# Patient Record
Sex: FEMALE | Race: BLACK OR AFRICAN AMERICAN | NOT HISPANIC OR LATINO
[De-identification: names, ages, dates, MRNs, and addresses within clinical notes are randomized per-mention and may not be internally consistent; named-entity substitution may affect disease eponyms.]

---

## 2022-12-08 ENCOUNTER — NON-APPOINTMENT (OUTPATIENT)
Age: 72
End: 2022-12-08

## 2023-02-02 ENCOUNTER — NON-APPOINTMENT (OUTPATIENT)
Age: 73
End: 2023-02-02

## 2023-11-21 ENCOUNTER — INPATIENT (INPATIENT)
Facility: HOSPITAL | Age: 73
LOS: 6 days | Discharge: HOME | DRG: 273 | End: 2023-11-28
Attending: INTERNAL MEDICINE | Admitting: INTERNAL MEDICINE
Payer: MEDICARE

## 2023-11-21 VITALS
HEIGHT: 65.5 IN | HEART RATE: 133 BPM | TEMPERATURE: 97 F | WEIGHT: 293 LBS | RESPIRATION RATE: 18 BRPM | SYSTOLIC BLOOD PRESSURE: 72 MMHG | OXYGEN SATURATION: 100 % | DIASTOLIC BLOOD PRESSURE: 57 MMHG

## 2023-11-21 LAB
ALBUMIN SERPL ELPH-MCNC: 4.3 G/DL — SIGNIFICANT CHANGE UP (ref 3.3–5)
ALBUMIN SERPL ELPH-MCNC: 4.3 G/DL — SIGNIFICANT CHANGE UP (ref 3.3–5)
ALP SERPL-CCNC: 94 U/L — SIGNIFICANT CHANGE UP (ref 40–120)
ALP SERPL-CCNC: 94 U/L — SIGNIFICANT CHANGE UP (ref 40–120)
ALT FLD-CCNC: 31 U/L — SIGNIFICANT CHANGE UP (ref 10–45)
ALT FLD-CCNC: 31 U/L — SIGNIFICANT CHANGE UP (ref 10–45)
ANION GAP SERPL CALC-SCNC: 16 MMOL/L — SIGNIFICANT CHANGE UP (ref 5–17)
ANION GAP SERPL CALC-SCNC: 16 MMOL/L — SIGNIFICANT CHANGE UP (ref 5–17)
ANISOCYTOSIS BLD QL: SIGNIFICANT CHANGE UP
ANISOCYTOSIS BLD QL: SIGNIFICANT CHANGE UP
APPEARANCE UR: ABNORMAL
APPEARANCE UR: ABNORMAL
APTT BLD: 21.7 SEC — LOW (ref 24.5–35.6)
APTT BLD: 21.7 SEC — LOW (ref 24.5–35.6)
AST SERPL-CCNC: 92 U/L — HIGH (ref 10–40)
AST SERPL-CCNC: 92 U/L — HIGH (ref 10–40)
BASOPHILS # BLD AUTO: 0 K/UL — SIGNIFICANT CHANGE UP (ref 0–0.2)
BASOPHILS # BLD AUTO: 0 K/UL — SIGNIFICANT CHANGE UP (ref 0–0.2)
BASOPHILS NFR BLD AUTO: 0 % — SIGNIFICANT CHANGE UP (ref 0–2)
BASOPHILS NFR BLD AUTO: 0 % — SIGNIFICANT CHANGE UP (ref 0–2)
BILIRUB DIRECT SERPL-MCNC: 0.3 MG/DL — SIGNIFICANT CHANGE UP (ref 0–0.3)
BILIRUB DIRECT SERPL-MCNC: 0.3 MG/DL — SIGNIFICANT CHANGE UP (ref 0–0.3)
BILIRUB INDIRECT FLD-MCNC: 0.8 MG/DL — SIGNIFICANT CHANGE UP (ref 0.2–1)
BILIRUB INDIRECT FLD-MCNC: 0.8 MG/DL — SIGNIFICANT CHANGE UP (ref 0.2–1)
BILIRUB SERPL-MCNC: 1.1 MG/DL — SIGNIFICANT CHANGE UP (ref 0.2–1.2)
BILIRUB SERPL-MCNC: 1.1 MG/DL — SIGNIFICANT CHANGE UP (ref 0.2–1.2)
BILIRUB UR-MCNC: NEGATIVE — SIGNIFICANT CHANGE UP
BILIRUB UR-MCNC: NEGATIVE — SIGNIFICANT CHANGE UP
BLD GP AB SCN SERPL QL: NEGATIVE — SIGNIFICANT CHANGE UP
BLD GP AB SCN SERPL QL: NEGATIVE — SIGNIFICANT CHANGE UP
BUN SERPL-MCNC: 41 MG/DL — HIGH (ref 7–23)
BUN SERPL-MCNC: 41 MG/DL — HIGH (ref 7–23)
CALCIUM SERPL-MCNC: 10.4 MG/DL — SIGNIFICANT CHANGE UP (ref 8.4–10.5)
CALCIUM SERPL-MCNC: 10.4 MG/DL — SIGNIFICANT CHANGE UP (ref 8.4–10.5)
CHLORIDE SERPL-SCNC: 96 MMOL/L — SIGNIFICANT CHANGE UP (ref 96–108)
CHLORIDE SERPL-SCNC: 96 MMOL/L — SIGNIFICANT CHANGE UP (ref 96–108)
CO2 SERPL-SCNC: 21 MMOL/L — LOW (ref 22–31)
CO2 SERPL-SCNC: 21 MMOL/L — LOW (ref 22–31)
COLOR SPEC: SIGNIFICANT CHANGE UP
COLOR SPEC: SIGNIFICANT CHANGE UP
CREAT SERPL-MCNC: 1.89 MG/DL — HIGH (ref 0.5–1.3)
CREAT SERPL-MCNC: 1.89 MG/DL — HIGH (ref 0.5–1.3)
DIFF PNL FLD: ABNORMAL
DIFF PNL FLD: ABNORMAL
EGFR: 28 ML/MIN/1.73M2 — LOW
EGFR: 28 ML/MIN/1.73M2 — LOW
EOSINOPHIL # BLD AUTO: 0 K/UL — SIGNIFICANT CHANGE UP (ref 0–0.5)
EOSINOPHIL # BLD AUTO: 0 K/UL — SIGNIFICANT CHANGE UP (ref 0–0.5)
EOSINOPHIL NFR BLD AUTO: 0 % — SIGNIFICANT CHANGE UP (ref 0–6)
EOSINOPHIL NFR BLD AUTO: 0 % — SIGNIFICANT CHANGE UP (ref 0–6)
GIANT PLATELETS BLD QL SMEAR: PRESENT — SIGNIFICANT CHANGE UP
GIANT PLATELETS BLD QL SMEAR: PRESENT — SIGNIFICANT CHANGE UP
GLUCOSE SERPL-MCNC: 196 MG/DL — HIGH (ref 70–99)
GLUCOSE SERPL-MCNC: 196 MG/DL — HIGH (ref 70–99)
GLUCOSE UR QL: NEGATIVE MG/DL — SIGNIFICANT CHANGE UP
GLUCOSE UR QL: NEGATIVE MG/DL — SIGNIFICANT CHANGE UP
HCT VFR BLD CALC: 34 % — LOW (ref 34.5–45)
HCT VFR BLD CALC: 34 % — LOW (ref 34.5–45)
HGB BLD-MCNC: 11.5 G/DL — SIGNIFICANT CHANGE UP (ref 11.5–15.5)
HGB BLD-MCNC: 11.5 G/DL — SIGNIFICANT CHANGE UP (ref 11.5–15.5)
INR BLD: 0.99 — SIGNIFICANT CHANGE UP (ref 0.85–1.18)
INR BLD: 0.99 — SIGNIFICANT CHANGE UP (ref 0.85–1.18)
KETONES UR-MCNC: ABNORMAL MG/DL
KETONES UR-MCNC: ABNORMAL MG/DL
LACTATE SERPL-SCNC: 3.2 MMOL/L — HIGH (ref 0.5–2)
LACTATE SERPL-SCNC: 3.2 MMOL/L — HIGH (ref 0.5–2)
LEUKOCYTE ESTERASE UR-ACNC: ABNORMAL
LEUKOCYTE ESTERASE UR-ACNC: ABNORMAL
LIDOCAIN IGE QN: 261 U/L — HIGH (ref 7–60)
LIDOCAIN IGE QN: 261 U/L — HIGH (ref 7–60)
LYMPHOCYTES # BLD AUTO: 0.8 K/UL — LOW (ref 1–3.3)
LYMPHOCYTES # BLD AUTO: 0.8 K/UL — LOW (ref 1–3.3)
LYMPHOCYTES # BLD AUTO: 9.6 % — LOW (ref 13–44)
LYMPHOCYTES # BLD AUTO: 9.6 % — LOW (ref 13–44)
MACROCYTES BLD QL: SIGNIFICANT CHANGE UP
MACROCYTES BLD QL: SIGNIFICANT CHANGE UP
MAGNESIUM SERPL-MCNC: 1.7 MG/DL — SIGNIFICANT CHANGE UP (ref 1.6–2.6)
MAGNESIUM SERPL-MCNC: 1.7 MG/DL — SIGNIFICANT CHANGE UP (ref 1.6–2.6)
MANUAL SMEAR VERIFICATION: SIGNIFICANT CHANGE UP
MANUAL SMEAR VERIFICATION: SIGNIFICANT CHANGE UP
MCHC RBC-ENTMCNC: 33.8 GM/DL — SIGNIFICANT CHANGE UP (ref 32–36)
MCHC RBC-ENTMCNC: 33.8 GM/DL — SIGNIFICANT CHANGE UP (ref 32–36)
MCHC RBC-ENTMCNC: 37 PG — HIGH (ref 27–34)
MCHC RBC-ENTMCNC: 37 PG — HIGH (ref 27–34)
MCV RBC AUTO: 109.3 FL — HIGH (ref 80–100)
MCV RBC AUTO: 109.3 FL — HIGH (ref 80–100)
MICROCYTES BLD QL: SLIGHT — SIGNIFICANT CHANGE UP
MICROCYTES BLD QL: SLIGHT — SIGNIFICANT CHANGE UP
MONOCYTES # BLD AUTO: 0.22 K/UL — SIGNIFICANT CHANGE UP (ref 0–0.9)
MONOCYTES # BLD AUTO: 0.22 K/UL — SIGNIFICANT CHANGE UP (ref 0–0.9)
MONOCYTES NFR BLD AUTO: 2.6 % — SIGNIFICANT CHANGE UP (ref 2–14)
MONOCYTES NFR BLD AUTO: 2.6 % — SIGNIFICANT CHANGE UP (ref 2–14)
NEUTROPHILS # BLD AUTO: 7.19 K/UL — SIGNIFICANT CHANGE UP (ref 1.8–7.4)
NEUTROPHILS # BLD AUTO: 7.19 K/UL — SIGNIFICANT CHANGE UP (ref 1.8–7.4)
NEUTROPHILS NFR BLD AUTO: 86 % — HIGH (ref 43–77)
NEUTROPHILS NFR BLD AUTO: 86 % — HIGH (ref 43–77)
NITRITE UR-MCNC: NEGATIVE — SIGNIFICANT CHANGE UP
NITRITE UR-MCNC: NEGATIVE — SIGNIFICANT CHANGE UP
NT-PROBNP SERPL-SCNC: 3990 PG/ML — HIGH (ref 0–300)
NT-PROBNP SERPL-SCNC: 3990 PG/ML — HIGH (ref 0–300)
OVALOCYTES BLD QL SMEAR: SLIGHT — SIGNIFICANT CHANGE UP
OVALOCYTES BLD QL SMEAR: SLIGHT — SIGNIFICANT CHANGE UP
PH UR: 5.5 — SIGNIFICANT CHANGE UP (ref 5–8)
PH UR: 5.5 — SIGNIFICANT CHANGE UP (ref 5–8)
PHOSPHATE SERPL-MCNC: 2.3 MG/DL — LOW (ref 2.5–4.5)
PHOSPHATE SERPL-MCNC: 2.3 MG/DL — LOW (ref 2.5–4.5)
PLAT MORPH BLD: ABNORMAL
PLAT MORPH BLD: ABNORMAL
PLATELET # BLD AUTO: 136 K/UL — LOW (ref 150–400)
PLATELET # BLD AUTO: 136 K/UL — LOW (ref 150–400)
POIKILOCYTOSIS BLD QL AUTO: SLIGHT — SIGNIFICANT CHANGE UP
POIKILOCYTOSIS BLD QL AUTO: SLIGHT — SIGNIFICANT CHANGE UP
POLYCHROMASIA BLD QL SMEAR: SLIGHT — SIGNIFICANT CHANGE UP
POLYCHROMASIA BLD QL SMEAR: SLIGHT — SIGNIFICANT CHANGE UP
POTASSIUM SERPL-MCNC: 3.8 MMOL/L — SIGNIFICANT CHANGE UP (ref 3.5–5.3)
POTASSIUM SERPL-MCNC: 3.8 MMOL/L — SIGNIFICANT CHANGE UP (ref 3.5–5.3)
POTASSIUM SERPL-SCNC: 3.8 MMOL/L — SIGNIFICANT CHANGE UP (ref 3.5–5.3)
POTASSIUM SERPL-SCNC: 3.8 MMOL/L — SIGNIFICANT CHANGE UP (ref 3.5–5.3)
PROMYELOCYTES # FLD: 0.9 % — HIGH (ref 0–0)
PROMYELOCYTES # FLD: 0.9 % — HIGH (ref 0–0)
PROT SERPL-MCNC: 8.4 G/DL — HIGH (ref 6–8.3)
PROT SERPL-MCNC: 8.4 G/DL — HIGH (ref 6–8.3)
PROT UR-MCNC: 30 MG/DL
PROT UR-MCNC: 30 MG/DL
PROTHROM AB SERPL-ACNC: 11.3 SEC — SIGNIFICANT CHANGE UP (ref 9.5–13)
PROTHROM AB SERPL-ACNC: 11.3 SEC — SIGNIFICANT CHANGE UP (ref 9.5–13)
RBC # BLD: 3.11 M/UL — LOW (ref 3.8–5.2)
RBC # BLD: 3.11 M/UL — LOW (ref 3.8–5.2)
RBC # FLD: 14.2 % — SIGNIFICANT CHANGE UP (ref 10.3–14.5)
RBC # FLD: 14.2 % — SIGNIFICANT CHANGE UP (ref 10.3–14.5)
RBC BLD AUTO: ABNORMAL
RBC BLD AUTO: ABNORMAL
RH IG SCN BLD-IMP: POSITIVE — SIGNIFICANT CHANGE UP
SMUDGE CELLS # BLD: PRESENT — SIGNIFICANT CHANGE UP
SMUDGE CELLS # BLD: PRESENT — SIGNIFICANT CHANGE UP
SODIUM SERPL-SCNC: 133 MMOL/L — LOW (ref 135–145)
SODIUM SERPL-SCNC: 133 MMOL/L — LOW (ref 135–145)
SP GR SPEC: 1.03 — SIGNIFICANT CHANGE UP (ref 1–1.03)
SP GR SPEC: 1.03 — SIGNIFICANT CHANGE UP (ref 1–1.03)
TROPONIN T, HIGH SENSITIVITY RESULT: 46 NG/L — SIGNIFICANT CHANGE UP (ref 0–51)
TROPONIN T, HIGH SENSITIVITY RESULT: 46 NG/L — SIGNIFICANT CHANGE UP (ref 0–51)
UROBILINOGEN FLD QL: 1 MG/DL — SIGNIFICANT CHANGE UP (ref 0.2–1)
UROBILINOGEN FLD QL: 1 MG/DL — SIGNIFICANT CHANGE UP (ref 0.2–1)
VARIANT LYMPHS # BLD: 0.9 % — SIGNIFICANT CHANGE UP (ref 0–6)
VARIANT LYMPHS # BLD: 0.9 % — SIGNIFICANT CHANGE UP (ref 0–6)
WBC # BLD: 8.36 K/UL — SIGNIFICANT CHANGE UP (ref 3.8–10.5)
WBC # BLD: 8.36 K/UL — SIGNIFICANT CHANGE UP (ref 3.8–10.5)
WBC # FLD AUTO: 8.36 K/UL — SIGNIFICANT CHANGE UP (ref 3.8–10.5)
WBC # FLD AUTO: 8.36 K/UL — SIGNIFICANT CHANGE UP (ref 3.8–10.5)

## 2023-11-21 PROCEDURE — 71045 X-RAY EXAM CHEST 1 VIEW: CPT | Mod: 26

## 2023-11-21 PROCEDURE — 99285 EMERGENCY DEPT VISIT HI MDM: CPT

## 2023-11-21 PROCEDURE — 70450 CT HEAD/BRAIN W/O DYE: CPT | Mod: 26,MA

## 2023-11-21 RX ORDER — SODIUM CHLORIDE 9 MG/ML
1000 INJECTION INTRAMUSCULAR; INTRAVENOUS; SUBCUTANEOUS ONCE
Refills: 0 | Status: COMPLETED | OUTPATIENT
Start: 2023-11-21 | End: 2023-11-21

## 2023-11-21 RX ORDER — METOPROLOL TARTRATE 50 MG
12.5 TABLET ORAL EVERY 8 HOURS
Refills: 0 | Status: DISCONTINUED | OUTPATIENT
Start: 2023-11-21 | End: 2023-11-22

## 2023-11-21 RX ORDER — POTASSIUM CHLORIDE 20 MEQ
40 PACKET (EA) ORAL ONCE
Refills: 0 | Status: COMPLETED | OUTPATIENT
Start: 2023-11-21 | End: 2023-11-22

## 2023-11-21 RX ORDER — ACETAMINOPHEN 500 MG
650 TABLET ORAL EVERY 6 HOURS
Refills: 0 | Status: DISCONTINUED | OUTPATIENT
Start: 2023-11-21 | End: 2023-11-28

## 2023-11-21 RX ORDER — MAGNESIUM OXIDE 400 MG ORAL TABLET 241.3 MG
400 TABLET ORAL ONCE
Refills: 0 | Status: COMPLETED | OUTPATIENT
Start: 2023-11-21 | End: 2023-11-22

## 2023-11-21 RX ORDER — VANCOMYCIN HCL 1 G
1000 VIAL (EA) INTRAVENOUS ONCE
Refills: 0 | Status: COMPLETED | OUTPATIENT
Start: 2023-11-21 | End: 2023-11-21

## 2023-11-21 RX ORDER — DILTIAZEM HCL 120 MG
15 CAPSULE, EXT RELEASE 24 HR ORAL ONCE
Refills: 0 | Status: COMPLETED | OUTPATIENT
Start: 2023-11-21 | End: 2023-11-21

## 2023-11-21 RX ORDER — PIPERACILLIN AND TAZOBACTAM 4; .5 G/20ML; G/20ML
3.38 INJECTION, POWDER, LYOPHILIZED, FOR SOLUTION INTRAVENOUS ONCE
Refills: 0 | Status: COMPLETED | OUTPATIENT
Start: 2023-11-21 | End: 2023-11-21

## 2023-11-21 RX ADMIN — PIPERACILLIN AND TAZOBACTAM 200 GRAM(S): 4; .5 INJECTION, POWDER, LYOPHILIZED, FOR SOLUTION INTRAVENOUS at 22:29

## 2023-11-21 RX ADMIN — SODIUM CHLORIDE 1000 MILLILITER(S): 9 INJECTION INTRAMUSCULAR; INTRAVENOUS; SUBCUTANEOUS at 21:26

## 2023-11-21 RX ADMIN — Medication 250 MILLIGRAM(S): at 22:49

## 2023-11-21 RX ADMIN — Medication 15 MILLIGRAM(S): at 22:32

## 2023-11-21 NOTE — H&P ADULT - ASSESSMENT
74 y/o  female who lives alone,  denies any past medical history, (currently not taking medication) poor medical follow up  (has not seen a physician in years) presents to [facility] ER this evening 11/21/23, with syncope w/ head trauma, with prodrome symptoms of fatigue and lightheadedness.  In ER pt. noted to be in new onset Atrial Flutter 2:1 .  Pt. reports over the past year she had six episodes of unwitnessed near syncope with prodrome symptoms of fatigue and lightheadedness without LOC.  According to patient, she has not seen a physician for regular check ups or for prior syncopal episodes.     74 y/o  female, retired [employer]  , who lives alone,  denies any past medical history, (currently not taking medication) poor medical follow up  (has not seen a physician in years) presents to [facility] ER this evening 11/21/23, with syncope w/ head trauma, with prodrome symptoms of fatigue and lightheadedness.  In ER pt. noted to be in new onset Atrial Flutter 2:1 .  Pt. reports over the past year she had six episodes of unwitnessed  syncope with prodrome symptoms of fatigue and lightheadedness LOC approx 2-3 minutes, no bowel or urinary incontinence or AMS.  According to patient, she has not seen a physician for regular check ups  in years, (last PCP @[facility]  approx 20yrs ago)or for prior syncopal episodes.

## 2023-11-21 NOTE — H&P ADULT - PROBLEM SELECTOR PLAN 2
Atrial Flutter 2:1 's  -Metoprolol Tartrate 12.5mg q8hrs  -NO AC 2/2 to subacute/chronic subdural hematoma  -EP consult in AM  -Neuro consult in AM to go over repeat 2AM CT of head w/o contrast

## 2023-11-21 NOTE — ED PROVIDER NOTE - OBJECTIVE STATEMENT
73 year old female who denies any past medical history, denies taking any medications, presenting with dizziness, fall. Pt lives alone. Has felt dizzy and lightheaded, has fallen several times over last several months. Today, felt lightheaded and fatigued, + head trauma, no LOC. Pt endorsing fatigue and lightheadedness. Denies cp or sob or abd pain. No nausea or vomiting, no diarrhea or constipation, no melena or brbpr. Denies numbness, weakness, tingling. No urinary complaints. ROS as above.

## 2023-11-21 NOTE — ED ADULT NURSE NOTE - OBJECTIVE STATEMENT
Pt reports intermittent dizziness, lightheadedness and multiple falls ever past 6 months. Pt reports she fell today and hit her head. Pt denies any LOC, no blood thinners. Pt reports she has hx of blood transfusions. Pt denies any CP/SOB, no N/V/D, no blood in stool. Pt reports decreased appetite. Pt Aox3. Pt reports no medical hx, no daily medications. Pt arrives hypotensive, tachycardic, afebrile. Pt has cold extremities.

## 2023-11-21 NOTE — ED PROVIDER NOTE - PHYSICAL EXAMINATION
general: Dry appearing, in no acute distress  HEENT: Normocephalic, atraumatic, extraocular movements intact  CV: Tachycardic  Pulm: No respiratory distress, no tachypnea  Abd: Flat, no gross distension, soft, nontender, no r/g  Ext: warm and well perfused  Skin: No gross rashes or lesions  Neuro: Alert and oriented, moving all extremities

## 2023-11-21 NOTE — ED ADULT TRIAGE NOTE - CHIEF COMPLAINT QUOTE
fall this after about 1pm today been having  multiple fall for the last 6 months, today hit her head with loc non witnessed fall denies blood thinner, been having dizziness intermittently for 6 months

## 2023-11-21 NOTE — ED PROVIDER NOTE - NS ED ROS FT
Constitutional: No fever or chills, fatigue, lightheadedness  Eyes: No discharge or drainage  Ears, Nose, Mouth, Throat: No nasal discharge, no sore throat  Cardiovascular: No chest pain, no palpitations  Respiratory: No shortness of breath, no cough  Gastrointestinal: No nausea or vomiting, no abdominal pain, no diarrhea or constipation  Musculoskeletal: No joint pain, no swelling  Skin: No rashes or lesions  Neurological: No numbness, weakness, tingling, no headache

## 2023-11-21 NOTE — H&P ADULT - HISTORY OF PRESENT ILLNESS
A&O X3  Full Code Syncope with new Atrial Flutter      72 y/o  female who lives alone,  denies any past medical history, (currently not taking medication) poor medical follow up  (has not seen a physician in years) presents to [facility] ER this evening 11/21/23, with syncope w/ head trauma, with prodrome symptoms of fatigue and lightheadedness.  In ER pt. noted to be in new onset Atrial Flutter 2:1 .  Pt. reports over the past year she had six episodes of unwitnessed near syncope with prodrome symptoms of fatigue and lightheadedness without LOC.  According to patient, she has not seen a physician for regular check ups or for prior syncopal episodes.  Patient admitted to [facility] under cardiology for syncope, new onset atrial flutter and new onset    In ER ECF       Pt. denies fever, chills, HA, cough, chest pain, SOB, palpitations, diaphoresis, abdominal discomfort and n/v/d.  A&O X3  Full Code Syncope with new Atrial Flutter      72 y/o  female who lives alone,  denies any past medical history, (currently not taking medication) poor medical follow up  (has not seen a physician in years) presents to [facility] ER this evening 11/21/23, with syncope w/ head trauma, with prodrome symptoms of fatigue and lightheadedness.  In ER pt. noted to be in new onset Atrial Flutter 2:1 .  Pt. reports over the past year she had six episodes of unwitnessed near syncope with prodrome symptoms of fatigue and lightheadedness without LOC.  According to patient, she has not seen a physician for regular check ups or for prior syncopal episodes.      In ER ECG reveals new Atrial Flutter 2:1 with  and non specific ST-T wave changes, Troponin T High Sensitivity 46, [id number], H/H 11.5/34.0, Plts 136, WBC 8.36, Neutrophil 86,  Lactate 3.2, Na 133, BUN/Cr 41/1.89. K+3.8, Mg 1.7. Blood Glucose 196,  Lipase 261, AST 92, Phos 2.3  Patient received Diltiazem 15mg IV X1, decreasing 's, [id number] IV X1 and Zosyn 3.37 IV X1 for elevated Neutrophil, CXR AP prelim reveals no consolidation or  infiltrates no effusion.  CT of Head w/o contrast reveals No acute intracranial hemorrhage or calvarial fracture.Small hypodense collection over the left parietal convexity measures up to 4 mm in thickness and is suggestive of a subacute/chronic subdural hematoma.  NO AC initiated Neuro recommends no AC and repeat CT of head w/o contrast in 4 hours (2AM).  VSS:   Pt. denies fever, chills, HA, cough, chest pain, SOB, palpitations, diaphoresis, abdominal discomfort and n/v/d.     Pt. is admitted to [facility] for syncope work up, new Atrial Flutter, Renal Insufficiency (no baseline Bun/Cr 41/1.89), and further metabolic work up.  TTE in AM     Active issue: Syncope, new Atrial Flutter, Renal Insufficiency (no baseline; has not followed up with physician in years), Thrombocytopenia Plts 136 new DM2? A&O X3  Full Code Syncope with new Atrial Flutter      72 y/o  female, retired [employer]  , who lives alone,  denies any past medical history, (currently not taking medication) poor medical follow up  (has not seen a physician in years) presents to [facility] ER this evening 11/21/23, with syncope w/ head trauma, with prodrome symptoms of fatigue and lightheadedness.  In ER pt. noted to be in new onset Atrial Flutter 2:1 .  Pt. reports over the past year she had six episodes of unwitnessed  syncope with prodrome symptoms of fatigue and lightheadedness LOC approx 2-3 minutes, no bowel or urinary incontinence or AMS.  According to patient, she has not seen a physician for regular check ups  in years, (last PCP @[facility]  approx 20yrs ago)or for prior syncopal episodes.      In speaking with patient, she reports within the last year she has been experiencing multiple, six episodes of syncope with prodrome symptoms of lightheadedness with LOC for 2-3 minutes. This morning she was walking around in her apartment when suddenly she felt lightheaded with LOC and hitting her head on hard floor.   LOC for 2 minutes, no urinary or bowel incontinence  and mild confusion lasting a few seconds. Pt. has not seen a physician for approx 15-20yrs, although she reports when she has URI or acutely sick she goes to Urgent Care.    Last Colonoscopy was performed at [facility] clinic as routine check up which she reports was negative; last mammogram was 15 yrs ago.  She also reports occasional clear to milky white discharge from both breast nipples.     In ER ECG reveals new Atrial Flutter 2:1 with  and non specific ST-T wave changes, Troponin T High Sensitivity 46, [id number], H/H 11.5/34.0, Plts 136, WBC 8.36, Neutrophil 86,  Lactate 3.2, Na 133, BUN/Cr 41/1.89. K+3.8, Mg 1.7. Blood Glucose 196,  Lipase 261, AST 92, Phos 2.3  Patient received Diltiazem 15mg IV X1, decreasing 's, [id number] IV X1 and Zosyn 3.37 IV X1 for elevated Neutrophil, CXR AP prelim reveals no consolidation or  infiltrates no effusion.  CT of Head w/o contrast reveals No acute intracranial hemorrhage or calvarial fracture.Small hypodense collection over the left parietal convexity measures up to 4 mm in thickness and is suggestive of a subacute/chronic subdural hematoma.  NO AC initiated Neuro recommends no AC and repeat CT of head w/o contrast in 4 hours (2AM).  VSS:   Pt. denies fever, chills, HA, cough, chest pain, SOB, palpitations, diaphoresis, abdominal discomfort and n/v/d.     Pt. is admitted to [facility] for syncope work up, new Atrial Flutter, Renal Insufficiency (no baseline Bun/Cr 41/1.89), and further metabolic work up.  TTE in AM     Active issue: Syncope, new Atrial Flutter, Renal Insufficiency (no baseline; has not followed up with physician in years), Thrombocytopenia Plts 136 new DM2? A&O X3  Full Code Syncope with new Atrial Flutter, Renal Insufficiency, Metabolic workup      74 y/o  female, retired [employer]  , who lives alone,  denies any past medical history, (currently not taking medication) poor medical follow up  (has not seen a physician in years) presents to [facility] ER this evening 11/21/23, with syncope w/ head trauma, with prodrome symptoms of fatigue and lightheadedness.  In ER pt. noted to be in new onset Atrial Flutter 2:1 .  Pt. reports over the past year she had six episodes of unwitnessed  syncope with prodrome symptoms of fatigue and lightheadedness LOC approx 2-3 minutes, no bowel or urinary incontinence or AMS.  According to patient, she has not seen a physician for regular check ups  in years, (last PCP @[facility]  approx 20yrs ago)or for prior syncopal episodes.      In speaking with patient, she reports within the last year she has been experiencing multiple, six episodes of syncope with prodrome symptoms of lightheadedness with LOC for 2-3 minutes. This morning she was walking around in her apartment when suddenly she felt lightheaded with LOC and hitting her head on hard floor.   LOC for 2 minutes, no urinary or bowel incontinence  and mild confusion lasting a few seconds. Pt. has not seen a physician for approx 15-20yrs, although she reports when she has URI or acutely sick she goes to Urgent Care.    Last Colonoscopy was performed at [facility] clinic as routine check up which she reports was negative; last mammogram was 15 yrs ago.  She also reports occasional clear to milky white discharge from both breast nipples.     In ER ECG reveals new Atrial Flutter 2:1 with  and non specific ST-T wave changes, Troponin T High Sensitivity 46, [id number], H/H 11.5/34.0, Plts 136, WBC 8.36, Neutrophil 86,  Lactate 3.2, Na 133, BUN/Cr 41/1.89. K+3.8, Mg 1.7. Blood Glucose 196,  Lipase 261, AST 92, Phos 2.3  Patient received Diltiazem 15mg IV X1, decreasing 's, [id number] IV X1 and Zosyn 3.37 IV X1 for elevated Neutrophil, CXR AP prelim reveals no consolidation or  infiltrates no effusion.  CT of Head w/o contrast reveals No acute intracranial hemorrhage or calvarial fracture.Small hypodense collection over the left parietal convexity measures up to 4 mm in thickness and is suggestive of a subacute/chronic subdural hematoma.  NO AC initiated Neuro recommends no AC and repeat CT of head w/o contrast in 4 hours (2AM).  VSS:   Pt. denies fever, chills, HA, cough, chest pain, SOB, palpitations, diaphoresis, abdominal discomfort and n/v/d.     Pt. is admitted to [facility] for syncope work up, new Atrial Flutter, Renal Insufficiency (no baseline Bun/Cr 41/1.89), and further metabolic work up.  TTE in AM     Active issue: Syncope, new Atrial Flutter, Renal Insufficiency (no baseline; has not followed up with physician in years), Thrombocytopenia Plts 136 new DM2?

## 2023-11-21 NOTE — H&P ADULT - NSHPPHYSICALEXAM_GEN_ALL_CORE
T(C): 36.7 (11-21-23 @ 23:14), Max: 36.7 (11-21-23 @ 23:14)  HR: 71 (11-21-23 @ 23:14) (71 - 145)  BP: 116/69 (11-21-23 @ 23:14) (72/57 - 119/71)  RR: 18 (11-21-23 @ 23:14) (18 - 19)  SpO2: 100% (11-21-23 @ 23:14) (99% - 100%)  Wt(kg): --    Appearance: NAD  HEENT:   Normal oral mucosa, PERRL, EOMI	  Neck: Supple,  - JVD; No Carotid Bruit and 2+ pulses B/L  Cardiovascular: Irregular Regular, No JVD, No murmurs  Respiratory: Lungs clear to auscultation/Decreased Breath Sounds/No Rales, Rhonchi, Wheezing	  Gastrointestinal:  Soft, Non-tender, + BS	  Skin: No rashes, No ecchymoses, No cyanosis  Extremities: Normal range of motion, No clubbing, cyanosis or edema  Vascular: Femoral pulses 2+ b/l without bruit, DP 1+ b/l, PT 1+ b/l  Neurologic: Non-focal  Psychiatry: A & O x 3, Mood & affect appropriate

## 2023-11-21 NOTE — H&P ADULT - PROBLEM SELECTOR PLAN 5
WBC 8.38 and Neutrophil 86    -received Vanco and Zosyn in ER f/u blood cultures (no need to continue for now, afebrile and no leukocytosis) pt. denies fever or recent hx of URI  -F/U RVP panel including COVID-19, procalcitonin level  -CXR AP reveals prelim no acute pathology, follow up official read

## 2023-11-21 NOTE — H&P ADULT - NSHPADDITIONALINFOADULT_GEN_ALL_CORE
Attending Attestation:  I was physically present for the key portions of the evaluation and management (E/M) service provided.  I agree with the above history, physical, and plan which I have reviewed with the following edits/addendum:    - reviewed TTE 11/22 images: LVEF 35% with beat variation due to AFL. 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 Will initiat GDMT for heart failure. Pt is euvolemic on exam.  - plan for AC after hemonc evaluation of thrombocytopenia, NS of brain hemorrhage  - if ok to AC, will proceed with rhythm control with RIKY Vallejo MD  Cardiology  Initial encounter 11/22

## 2023-11-21 NOTE — H&P ADULT - PROBLEM SELECTOR PLAN 3
BUN/Creatinine 41/1.89 (no baseline)  -follow up Urine Studies (lytes)  -monitor BMP including Phos  -Nephro consult in AM

## 2023-11-21 NOTE — ED PROVIDER NOTE - CLINICAL SUMMARY MEDICAL DECISION MAKING FREE TEXT BOX
73 year old f with dizziness, fall, + head trauma. Initially hypotensive here. Rectally afebrile, brown stool on rectal exam, abd soft and nontender. Aflutter with 2:1 conduction here. POCUS without B-lines, not sig reduced EF, no pericardial effusion, IVC collapsible. Will do infectious workup, IV hydration, reassess.

## 2023-11-21 NOTE — H&P ADULT - PROBLEM SELECTOR PLAN 1
hx of multiple syncopal episodes with prodrome lightheadedness within the year  Telemetry, ECG, serial CE, (ECG reveals Atrial Flutter 2:1 's) Troponin T HS neg 46  -CT of Head w/o contrast reveals No acute intracranial hemorrhage or calvarial fracture.Small hypodense collection over the left parietal convexity measures up to 4 mm in thickness and is suggestive of a subacute/chronic subdural hematoma.  NO AC initiated Neuro recommends no AC and repeat CT of head w/o contrast in 4 hours (2AM).  -Orthostatic  -strict bedrest  -TTE  -EP consult likely in setting or new Atrial Flutter 2:1 's   -further cardiac workup

## 2023-11-21 NOTE — ED PROVIDER NOTE - PROGRESS NOTE DETAILS
lipase elevated but no abd pain, no n/v. abd soft and nontender. trop and bnp elevated. received dilt with response, now rate controlled, aflutter with 4:1. CT head neg, delay with attending read per rads resident. will admit to cards. lipase elevated but no abd pain, no n/v. abd soft and nontender. trop and bnp elevated. received dilt with response, now rate controlled, aflutter with 4:1. CT head with subacute/chronic subdural. Spoke to NSGY. Recommends holding off on AC, 4 hr CT head. Neuro intact. PT without any headache or neuro deficits. Will admit to cards.

## 2023-11-21 NOTE — H&P ADULT - NSHPLABSRESULTS_GEN_ALL_CORE
11.5   8.36  )-----------( 136      ( 21 Nov 2023 21:32 )             34.0       11-21    133<L>  |  96  |  41<H>  ----------------------------<  196<H>  3.8   |  21<L>  |  1.89<H>    Ca    10.4      21 Nov 2023 21:32  Phos  2.3     11-21  Mg     1.7     11-21    TPro  8.4<H>  /  Alb  4.3  /  TBili  1.1  /  DBili  0.3  /  AST  92<H>  /  ALT  31  /  AlkPhos  94  11-21      PT/INR - ( 21 Nov 2023 21:32 )   PT: 11.3 sec;   INR: 0.99          PTT - ( 21 Nov 2023 21:32 )  PTT:21.7 sec          Urinalysis Basic - ( 21 Nov 2023 21:32 )    Color: x / Appearance: x / SG: x / pH: x  Gluc: 196 mg/dL / Ketone: x  / Bili: x / Urobili: x   Blood: x / Protein: x / Nitrite: x   Leuk Esterase: x / RBC: x / WBC x   Sq Epi: x / Non Sq Epi: x / Bacteria: x        EKG: Atrial Flutter 2:1 HR 142bpm with non specific ST-T wave changes

## 2023-11-21 NOTE — ED ADULT NURSE NOTE - NSFALLRISKINTERV_ED_ALL_ED
Assistance OOB with selected safe patient handling equipment if applicable/Assistance with ambulation/Communicate fall risk and risk factors to all staff, patient, and family/Encourage patient to sit up slowly, dangle for a short time, stand at bedside before walking/Monitor gait and stability/Orthostatic vital signs/Provide patient with walking aids/Provide visual cue: yellow wristband, yellow gown, etc/Reinforce activity limits and safety measures with patient and family/Call bell, personal items and telephone in reach/Instruct patient to call for assistance before getting out of bed/chair/stretcher/Non-slip footwear applied when patient is off stretcher/[place] to call system/Physically safe environment - no spills, clutter or unnecessary equipment/Purposeful Proactive Rounding/Room/bathroom lighting operational, light cord in reach

## 2023-11-21 NOTE — H&P ADULT - NSHPREVIEWOFSYSTEMS_GEN_ALL_CORE
GENERAL, CONSTITUTIONAL : denies recent weight loss, fever, chills  EYES, VISION: denies changes in vision   EARS, NOSE, THROAT: denies hearing loss  HEART, CARDIOVASCULAR: denies chest pain, hx of prior arrhythmia, palpitations,   RESPIRATORY: Denies cough, SOB, wheezing, PND, orthopnea  GASTROINTESTINAL: Denies abdominal pain, heartburn, bloody stool, dark tarry stool  GENITOURINARY: Denies frequent urination, urgency  MUSCULOSKELETAL denies joint pain or swelling, restricted motion, musculoskeletal pain.   SKIN & INTEGUMENTARY Denies rashes, sores, blisters, blisters, growths.  NEUROLOGICAL: Admits to lightheadedness, generalized fatiguw; Denies numbness or tingling sensations, sensation loss, burning.   PSYCHIATRIC: Denies nervousness, anxiety, depression  ENDOCRINE Denies heat or cold intolerance, excessive thirst  HEMATOLOGIC/LYMPHATIC: Denies abnormal bleeding, bleeding of any kind

## 2023-11-22 DIAGNOSIS — R55 SYNCOPE AND COLLAPSE: ICD-10-CM

## 2023-11-22 DIAGNOSIS — D72.9 DISORDER OF WHITE BLOOD CELLS, UNSPECIFIED: ICD-10-CM

## 2023-11-22 DIAGNOSIS — D61.818 OTHER PANCYTOPENIA: ICD-10-CM

## 2023-11-22 DIAGNOSIS — N28.9 DISORDER OF KIDNEY AND URETER, UNSPECIFIED: ICD-10-CM

## 2023-11-22 DIAGNOSIS — R79.89 OTHER SPECIFIED ABNORMAL FINDINGS OF BLOOD CHEMISTRY: ICD-10-CM

## 2023-11-22 DIAGNOSIS — Z78.9 OTHER SPECIFIED HEALTH STATUS: ICD-10-CM

## 2023-11-22 DIAGNOSIS — S06.5X1A TRAUMATIC SUBDURAL HEMORRHAGE WITH LOSS OF CONSCIOUSNESS OF 30 MINUTES OR LESS, INITIAL ENCOUNTER: ICD-10-CM

## 2023-11-22 DIAGNOSIS — Z90.710 ACQUIRED ABSENCE OF BOTH CERVIX AND UTERUS: Chronic | ICD-10-CM

## 2023-11-22 DIAGNOSIS — D69.6 THROMBOCYTOPENIA, UNSPECIFIED: ICD-10-CM

## 2023-11-22 DIAGNOSIS — I48.92 UNSPECIFIED ATRIAL FLUTTER: ICD-10-CM

## 2023-11-22 DIAGNOSIS — R73.9 HYPERGLYCEMIA, UNSPECIFIED: ICD-10-CM

## 2023-11-22 LAB
A1C WITH ESTIMATED AVERAGE GLUCOSE RESULT: 4.9 % — SIGNIFICANT CHANGE UP (ref 4–5.6)
A1C WITH ESTIMATED AVERAGE GLUCOSE RESULT: 4.9 % — SIGNIFICANT CHANGE UP (ref 4–5.6)
ALBUMIN SERPL ELPH-MCNC: 3.4 G/DL — SIGNIFICANT CHANGE UP (ref 3.3–5)
ALBUMIN SERPL ELPH-MCNC: 3.4 G/DL — SIGNIFICANT CHANGE UP (ref 3.3–5)
ALP SERPL-CCNC: 77 U/L — SIGNIFICANT CHANGE UP (ref 40–120)
ALP SERPL-CCNC: 77 U/L — SIGNIFICANT CHANGE UP (ref 40–120)
ALT FLD-CCNC: 25 U/L — SIGNIFICANT CHANGE UP (ref 10–45)
ALT FLD-CCNC: 25 U/L — SIGNIFICANT CHANGE UP (ref 10–45)
ANION GAP SERPL CALC-SCNC: 12 MMOL/L — SIGNIFICANT CHANGE UP (ref 5–17)
ANION GAP SERPL CALC-SCNC: 12 MMOL/L — SIGNIFICANT CHANGE UP (ref 5–17)
ANISOCYTOSIS BLD QL: SIGNIFICANT CHANGE UP
ANISOCYTOSIS BLD QL: SIGNIFICANT CHANGE UP
APPEARANCE UR: CLEAR — SIGNIFICANT CHANGE UP
APPEARANCE UR: CLEAR — SIGNIFICANT CHANGE UP
AST SERPL-CCNC: 71 U/L — HIGH (ref 10–40)
AST SERPL-CCNC: 71 U/L — HIGH (ref 10–40)
BACTERIA # UR AUTO: ABNORMAL /HPF
BACTERIA # UR AUTO: ABNORMAL /HPF
BACTERIA # UR AUTO: NEGATIVE /HPF — SIGNIFICANT CHANGE UP
BACTERIA # UR AUTO: NEGATIVE /HPF — SIGNIFICANT CHANGE UP
BASOPHILS # BLD AUTO: 0 K/UL — SIGNIFICANT CHANGE UP (ref 0–0.2)
BASOPHILS # BLD AUTO: 0 K/UL — SIGNIFICANT CHANGE UP (ref 0–0.2)
BASOPHILS NFR BLD AUTO: 0 % — SIGNIFICANT CHANGE UP (ref 0–2)
BASOPHILS NFR BLD AUTO: 0 % — SIGNIFICANT CHANGE UP (ref 0–2)
BILIRUB DIRECT SERPL-MCNC: 0.2 MG/DL — SIGNIFICANT CHANGE UP (ref 0–0.3)
BILIRUB DIRECT SERPL-MCNC: 0.2 MG/DL — SIGNIFICANT CHANGE UP (ref 0–0.3)
BILIRUB INDIRECT FLD-MCNC: 0.5 MG/DL — SIGNIFICANT CHANGE UP (ref 0.2–1)
BILIRUB INDIRECT FLD-MCNC: 0.5 MG/DL — SIGNIFICANT CHANGE UP (ref 0.2–1)
BILIRUB SERPL-MCNC: 0.7 MG/DL — SIGNIFICANT CHANGE UP (ref 0.2–1.2)
BILIRUB SERPL-MCNC: 0.7 MG/DL — SIGNIFICANT CHANGE UP (ref 0.2–1.2)
BILIRUB UR-MCNC: NEGATIVE — SIGNIFICANT CHANGE UP
BILIRUB UR-MCNC: NEGATIVE — SIGNIFICANT CHANGE UP
BUN SERPL-MCNC: 33 MG/DL — HIGH (ref 7–23)
BUN SERPL-MCNC: 33 MG/DL — HIGH (ref 7–23)
CALCIUM SERPL-MCNC: 9.2 MG/DL — SIGNIFICANT CHANGE UP (ref 8.4–10.5)
CALCIUM SERPL-MCNC: 9.2 MG/DL — SIGNIFICANT CHANGE UP (ref 8.4–10.5)
CAST: 0 /LPF — SIGNIFICANT CHANGE UP (ref 0–4)
CAST: 0 /LPF — SIGNIFICANT CHANGE UP (ref 0–4)
CAST: 21 /LPF — HIGH (ref 0–4)
CAST: 21 /LPF — HIGH (ref 0–4)
CHLORIDE SERPL-SCNC: 102 MMOL/L — SIGNIFICANT CHANGE UP (ref 96–108)
CHLORIDE SERPL-SCNC: 102 MMOL/L — SIGNIFICANT CHANGE UP (ref 96–108)
CHOLEST SERPL-MCNC: 182 MG/DL — SIGNIFICANT CHANGE UP
CHOLEST SERPL-MCNC: 182 MG/DL — SIGNIFICANT CHANGE UP
CK MB CFR SERPL CALC: 10.2 NG/ML — HIGH (ref 0–6.7)
CK MB CFR SERPL CALC: 10.2 NG/ML — HIGH (ref 0–6.7)
CK MB CFR SERPL CALC: 10.6 NG/ML — HIGH (ref 0–6.7)
CK MB CFR SERPL CALC: 10.6 NG/ML — HIGH (ref 0–6.7)
CK SERPL-CCNC: 563 U/L — HIGH (ref 25–170)
CK SERPL-CCNC: 563 U/L — HIGH (ref 25–170)
CK SERPL-CCNC: SIGNIFICANT CHANGE UP (ref 25–170)
CK SERPL-CCNC: SIGNIFICANT CHANGE UP (ref 25–170)
CO2 SERPL-SCNC: 18 MMOL/L — LOW (ref 22–31)
CO2 SERPL-SCNC: 18 MMOL/L — LOW (ref 22–31)
COLOR SPEC: YELLOW — SIGNIFICANT CHANGE UP
COLOR SPEC: YELLOW — SIGNIFICANT CHANGE UP
CREAT ?TM UR-MCNC: 106 MG/DL — SIGNIFICANT CHANGE UP
CREAT ?TM UR-MCNC: 106 MG/DL — SIGNIFICANT CHANGE UP
CREAT SERPL-MCNC: 1.35 MG/DL — HIGH (ref 0.5–1.3)
CREAT SERPL-MCNC: 1.35 MG/DL — HIGH (ref 0.5–1.3)
CRP SERPL-MCNC: 12.1 MG/L — HIGH (ref 0–4)
CRP SERPL-MCNC: 12.1 MG/L — HIGH (ref 0–4)
DIFF PNL FLD: ABNORMAL
DIFF PNL FLD: ABNORMAL
EGFR: 42 ML/MIN/1.73M2 — LOW
EGFR: 42 ML/MIN/1.73M2 — LOW
EOSINOPHIL # BLD AUTO: 0.11 K/UL — SIGNIFICANT CHANGE UP (ref 0–0.5)
EOSINOPHIL # BLD AUTO: 0.11 K/UL — SIGNIFICANT CHANGE UP (ref 0–0.5)
EOSINOPHIL NFR BLD AUTO: 2.9 % — SIGNIFICANT CHANGE UP (ref 0–6)
EOSINOPHIL NFR BLD AUTO: 2.9 % — SIGNIFICANT CHANGE UP (ref 0–6)
ESTIMATED AVERAGE GLUCOSE: 94 MG/DL — SIGNIFICANT CHANGE UP (ref 68–114)
ESTIMATED AVERAGE GLUCOSE: 94 MG/DL — SIGNIFICANT CHANGE UP (ref 68–114)
FERRITIN SERPL-MCNC: 1136 NG/ML — HIGH (ref 13–330)
FERRITIN SERPL-MCNC: 1136 NG/ML — HIGH (ref 13–330)
FOLATE SERPL-MCNC: 9.8 NG/ML — SIGNIFICANT CHANGE UP
FOLATE SERPL-MCNC: 9.8 NG/ML — SIGNIFICANT CHANGE UP
GIANT PLATELETS BLD QL SMEAR: PRESENT — SIGNIFICANT CHANGE UP
GIANT PLATELETS BLD QL SMEAR: PRESENT — SIGNIFICANT CHANGE UP
GLUCOSE SERPL-MCNC: 98 MG/DL — SIGNIFICANT CHANGE UP (ref 70–99)
GLUCOSE SERPL-MCNC: 98 MG/DL — SIGNIFICANT CHANGE UP (ref 70–99)
GLUCOSE UR QL: NEGATIVE MG/DL — SIGNIFICANT CHANGE UP
GLUCOSE UR QL: NEGATIVE MG/DL — SIGNIFICANT CHANGE UP
HCT VFR BLD CALC: 31.8 % — LOW (ref 34.5–45)
HCT VFR BLD CALC: 31.8 % — LOW (ref 34.5–45)
HCV AB S/CO SERPL IA: 0.04 S/CO — SIGNIFICANT CHANGE UP
HCV AB S/CO SERPL IA: 0.04 S/CO — SIGNIFICANT CHANGE UP
HCV AB SERPL-IMP: SIGNIFICANT CHANGE UP
HCV AB SERPL-IMP: SIGNIFICANT CHANGE UP
HDLC SERPL-MCNC: 62 MG/DL — SIGNIFICANT CHANGE UP
HDLC SERPL-MCNC: 62 MG/DL — SIGNIFICANT CHANGE UP
HGB BLD-MCNC: 10.3 G/DL — LOW (ref 11.5–15.5)
HGB BLD-MCNC: 10.3 G/DL — LOW (ref 11.5–15.5)
HYALINE CASTS # UR AUTO: PRESENT
HYALINE CASTS # UR AUTO: PRESENT
HYPOCHROMIA BLD QL: SLIGHT — SIGNIFICANT CHANGE UP
HYPOCHROMIA BLD QL: SLIGHT — SIGNIFICANT CHANGE UP
IRON SATN MFR SERPL: 18 % — SIGNIFICANT CHANGE UP (ref 14–50)
IRON SATN MFR SERPL: 18 % — SIGNIFICANT CHANGE UP (ref 14–50)
IRON SATN MFR SERPL: 36 UG/DL — SIGNIFICANT CHANGE UP (ref 30–160)
IRON SATN MFR SERPL: 36 UG/DL — SIGNIFICANT CHANGE UP (ref 30–160)
KETONES UR-MCNC: NEGATIVE MG/DL — SIGNIFICANT CHANGE UP
KETONES UR-MCNC: NEGATIVE MG/DL — SIGNIFICANT CHANGE UP
LACTATE SERPL-SCNC: 2.2 MMOL/L — HIGH (ref 0.5–2)
LACTATE SERPL-SCNC: 2.2 MMOL/L — HIGH (ref 0.5–2)
LEUKOCYTE ESTERASE UR-ACNC: NEGATIVE — SIGNIFICANT CHANGE UP
LEUKOCYTE ESTERASE UR-ACNC: NEGATIVE — SIGNIFICANT CHANGE UP
LIPID PNL WITH DIRECT LDL SERPL: 96 MG/DL — SIGNIFICANT CHANGE UP
LIPID PNL WITH DIRECT LDL SERPL: 96 MG/DL — SIGNIFICANT CHANGE UP
LYMPHOCYTES # BLD AUTO: 1.33 K/UL — SIGNIFICANT CHANGE UP (ref 1–3.3)
LYMPHOCYTES # BLD AUTO: 1.33 K/UL — SIGNIFICANT CHANGE UP (ref 1–3.3)
LYMPHOCYTES # BLD AUTO: 33.6 % — SIGNIFICANT CHANGE UP (ref 13–44)
LYMPHOCYTES # BLD AUTO: 33.6 % — SIGNIFICANT CHANGE UP (ref 13–44)
MACROCYTES BLD QL: SIGNIFICANT CHANGE UP
MACROCYTES BLD QL: SIGNIFICANT CHANGE UP
MAGNESIUM SERPL-MCNC: 1.6 MG/DL — SIGNIFICANT CHANGE UP (ref 1.6–2.6)
MAGNESIUM SERPL-MCNC: 1.6 MG/DL — SIGNIFICANT CHANGE UP (ref 1.6–2.6)
MANUAL SMEAR VERIFICATION: SIGNIFICANT CHANGE UP
MANUAL SMEAR VERIFICATION: SIGNIFICANT CHANGE UP
MCHC RBC-ENTMCNC: 32.4 GM/DL — SIGNIFICANT CHANGE UP (ref 32–36)
MCHC RBC-ENTMCNC: 32.4 GM/DL — SIGNIFICANT CHANGE UP (ref 32–36)
MCHC RBC-ENTMCNC: 37.5 PG — HIGH (ref 27–34)
MCHC RBC-ENTMCNC: 37.5 PG — HIGH (ref 27–34)
MCV RBC AUTO: 115.6 FL — HIGH (ref 80–100)
MCV RBC AUTO: 115.6 FL — HIGH (ref 80–100)
MONOCYTES # BLD AUTO: 0.3 K/UL — SIGNIFICANT CHANGE UP (ref 0–0.9)
MONOCYTES # BLD AUTO: 0.3 K/UL — SIGNIFICANT CHANGE UP (ref 0–0.9)
MONOCYTES NFR BLD AUTO: 7.7 % — SIGNIFICANT CHANGE UP (ref 2–14)
MONOCYTES NFR BLD AUTO: 7.7 % — SIGNIFICANT CHANGE UP (ref 2–14)
NEUTROPHILS # BLD AUTO: 2.21 K/UL — SIGNIFICANT CHANGE UP (ref 1.8–7.4)
NEUTROPHILS # BLD AUTO: 2.21 K/UL — SIGNIFICANT CHANGE UP (ref 1.8–7.4)
NEUTROPHILS NFR BLD AUTO: 55.8 % — SIGNIFICANT CHANGE UP (ref 43–77)
NEUTROPHILS NFR BLD AUTO: 55.8 % — SIGNIFICANT CHANGE UP (ref 43–77)
NITRITE UR-MCNC: NEGATIVE — SIGNIFICANT CHANGE UP
NITRITE UR-MCNC: NEGATIVE — SIGNIFICANT CHANGE UP
NON HDL CHOLESTEROL: 120 MG/DL — SIGNIFICANT CHANGE UP
NON HDL CHOLESTEROL: 120 MG/DL — SIGNIFICANT CHANGE UP
OSMOLALITY UR: 344 MOSM/KG — SIGNIFICANT CHANGE UP (ref 300–900)
OSMOLALITY UR: 344 MOSM/KG — SIGNIFICANT CHANGE UP (ref 300–900)
PH UR: 5.5 — SIGNIFICANT CHANGE UP (ref 5–8)
PH UR: 5.5 — SIGNIFICANT CHANGE UP (ref 5–8)
PLAT MORPH BLD: NORMAL — SIGNIFICANT CHANGE UP
PLAT MORPH BLD: NORMAL — SIGNIFICANT CHANGE UP
PLATELET # BLD AUTO: 50 K/UL — LOW (ref 150–400)
PLATELET # BLD AUTO: 50 K/UL — LOW (ref 150–400)
POIKILOCYTOSIS BLD QL AUTO: SLIGHT — SIGNIFICANT CHANGE UP
POIKILOCYTOSIS BLD QL AUTO: SLIGHT — SIGNIFICANT CHANGE UP
POLYCHROMASIA BLD QL SMEAR: SLIGHT — SIGNIFICANT CHANGE UP
POLYCHROMASIA BLD QL SMEAR: SLIGHT — SIGNIFICANT CHANGE UP
POTASSIUM SERPL-MCNC: 3.9 MMOL/L — SIGNIFICANT CHANGE UP (ref 3.5–5.3)
POTASSIUM SERPL-MCNC: 3.9 MMOL/L — SIGNIFICANT CHANGE UP (ref 3.5–5.3)
POTASSIUM SERPL-SCNC: 3.9 MMOL/L — SIGNIFICANT CHANGE UP (ref 3.5–5.3)
POTASSIUM SERPL-SCNC: 3.9 MMOL/L — SIGNIFICANT CHANGE UP (ref 3.5–5.3)
POTASSIUM UR-SCNC: 20 MMOL/L — SIGNIFICANT CHANGE UP
POTASSIUM UR-SCNC: 20 MMOL/L — SIGNIFICANT CHANGE UP
PROCALCITONIN SERPL-MCNC: 0.1 NG/ML — SIGNIFICANT CHANGE UP (ref 0.02–0.1)
PROCALCITONIN SERPL-MCNC: 0.1 NG/ML — SIGNIFICANT CHANGE UP (ref 0.02–0.1)
PROT ?TM UR-MCNC: 22 MG/DL — HIGH (ref 0–12)
PROT ?TM UR-MCNC: 22 MG/DL — HIGH (ref 0–12)
PROT SERPL-MCNC: 6.8 G/DL — SIGNIFICANT CHANGE UP (ref 6–8.3)
PROT SERPL-MCNC: 6.8 G/DL — SIGNIFICANT CHANGE UP (ref 6–8.3)
PROT UR-MCNC: SIGNIFICANT CHANGE UP MG/DL
PROT UR-MCNC: SIGNIFICANT CHANGE UP MG/DL
PROT/CREAT UR-RTO: 0.2 RATIO — SIGNIFICANT CHANGE UP (ref 0–0.2)
PROT/CREAT UR-RTO: 0.2 RATIO — SIGNIFICANT CHANGE UP (ref 0–0.2)
RAPID RVP RESULT: SIGNIFICANT CHANGE UP
RAPID RVP RESULT: SIGNIFICANT CHANGE UP
RBC # BLD: 2.75 M/UL — LOW (ref 3.8–5.2)
RBC # BLD: 2.75 M/UL — LOW (ref 3.8–5.2)
RBC # FLD: 14.4 % — SIGNIFICANT CHANGE UP (ref 10.3–14.5)
RBC # FLD: 14.4 % — SIGNIFICANT CHANGE UP (ref 10.3–14.5)
RBC BLD AUTO: ABNORMAL
RBC BLD AUTO: ABNORMAL
RBC CASTS # UR COMP ASSIST: 11 /HPF — HIGH (ref 0–4)
RBC CASTS # UR COMP ASSIST: 11 /HPF — HIGH (ref 0–4)
RBC CASTS # UR COMP ASSIST: 2 /HPF — SIGNIFICANT CHANGE UP (ref 0–4)
RBC CASTS # UR COMP ASSIST: 2 /HPF — SIGNIFICANT CHANGE UP (ref 0–4)
SARS-COV-2 RNA SPEC QL NAA+PROBE: SIGNIFICANT CHANGE UP
SARS-COV-2 RNA SPEC QL NAA+PROBE: SIGNIFICANT CHANGE UP
SMUDGE CELLS # BLD: PRESENT — SIGNIFICANT CHANGE UP
SMUDGE CELLS # BLD: PRESENT — SIGNIFICANT CHANGE UP
SODIUM SERPL-SCNC: 132 MMOL/L — LOW (ref 135–145)
SODIUM SERPL-SCNC: 132 MMOL/L — LOW (ref 135–145)
SODIUM UR-SCNC: 20 MMOL/L — SIGNIFICANT CHANGE UP
SODIUM UR-SCNC: 20 MMOL/L — SIGNIFICANT CHANGE UP
SP GR SPEC: 1.02 — SIGNIFICANT CHANGE UP (ref 1–1.03)
SP GR SPEC: 1.02 — SIGNIFICANT CHANGE UP (ref 1–1.03)
SQUAMOUS # UR AUTO: 29 /HPF — HIGH (ref 0–5)
SQUAMOUS # UR AUTO: 29 /HPF — HIGH (ref 0–5)
SQUAMOUS # UR AUTO: 8 /HPF — HIGH (ref 0–5)
SQUAMOUS # UR AUTO: 8 /HPF — HIGH (ref 0–5)
STOMATOCYTES BLD QL SMEAR: SLIGHT — SIGNIFICANT CHANGE UP
STOMATOCYTES BLD QL SMEAR: SLIGHT — SIGNIFICANT CHANGE UP
TIBC SERPL-MCNC: 200 UG/DL — LOW (ref 220–430)
TIBC SERPL-MCNC: 200 UG/DL — LOW (ref 220–430)
TRIGL SERPL-MCNC: 119 MG/DL — SIGNIFICANT CHANGE UP
TRIGL SERPL-MCNC: 119 MG/DL — SIGNIFICANT CHANGE UP
TROPONIN T, HIGH SENSITIVITY RESULT: 26 NG/L — SIGNIFICANT CHANGE UP (ref 0–51)
TROPONIN T, HIGH SENSITIVITY RESULT: 26 NG/L — SIGNIFICANT CHANGE UP (ref 0–51)
TROPONIN T, HIGH SENSITIVITY RESULT: 37 NG/L — SIGNIFICANT CHANGE UP (ref 0–51)
TROPONIN T, HIGH SENSITIVITY RESULT: 37 NG/L — SIGNIFICANT CHANGE UP (ref 0–51)
TSH SERPL-MCNC: 0.57 UIU/ML — SIGNIFICANT CHANGE UP (ref 0.27–4.2)
TSH SERPL-MCNC: 0.57 UIU/ML — SIGNIFICANT CHANGE UP (ref 0.27–4.2)
UIBC SERPL-MCNC: 164 UG/DL — SIGNIFICANT CHANGE UP (ref 110–370)
UIBC SERPL-MCNC: 164 UG/DL — SIGNIFICANT CHANGE UP (ref 110–370)
UROBILINOGEN FLD QL: 0.2 MG/DL — SIGNIFICANT CHANGE UP (ref 0.2–1)
UROBILINOGEN FLD QL: 0.2 MG/DL — SIGNIFICANT CHANGE UP (ref 0.2–1)
UUN UR-MCNC: 550 MG/DL — SIGNIFICANT CHANGE UP
UUN UR-MCNC: 550 MG/DL — SIGNIFICANT CHANGE UP
VIT B12 SERPL-MCNC: 461 PG/ML — SIGNIFICANT CHANGE UP (ref 232–1245)
VIT B12 SERPL-MCNC: 461 PG/ML — SIGNIFICANT CHANGE UP (ref 232–1245)
WBC # BLD: 3.96 K/UL — SIGNIFICANT CHANGE UP (ref 3.8–10.5)
WBC # BLD: 3.96 K/UL — SIGNIFICANT CHANGE UP (ref 3.8–10.5)
WBC # FLD AUTO: 3.96 K/UL — SIGNIFICANT CHANGE UP (ref 3.8–10.5)
WBC # FLD AUTO: 3.96 K/UL — SIGNIFICANT CHANGE UP (ref 3.8–10.5)
WBC UR QL: 2 /HPF — SIGNIFICANT CHANGE UP (ref 0–5)
WBC UR QL: 2 /HPF — SIGNIFICANT CHANGE UP (ref 0–5)
WBC UR QL: 3 /HPF — SIGNIFICANT CHANGE UP (ref 0–5)
WBC UR QL: 3 /HPF — SIGNIFICANT CHANGE UP (ref 0–5)

## 2023-11-22 PROCEDURE — 93306 TTE W/DOPPLER COMPLETE: CPT | Mod: 26

## 2023-11-22 PROCEDURE — 99221 1ST HOSP IP/OBS SF/LOW 40: CPT

## 2023-11-22 PROCEDURE — 99223 1ST HOSP IP/OBS HIGH 75: CPT

## 2023-11-22 PROCEDURE — 99222 1ST HOSP IP/OBS MODERATE 55: CPT

## 2023-11-22 PROCEDURE — 70450 CT HEAD/BRAIN W/O DYE: CPT | Mod: 26

## 2023-11-22 RX ORDER — FOLIC ACID 0.8 MG
1 TABLET ORAL DAILY
Refills: 0 | Status: DISCONTINUED | OUTPATIENT
Start: 2023-11-22 | End: 2023-11-28

## 2023-11-22 RX ORDER — METOPROLOL TARTRATE 50 MG
25 TABLET ORAL EVERY 8 HOURS
Refills: 0 | Status: DISCONTINUED | OUTPATIENT
Start: 2023-11-22 | End: 2023-11-22

## 2023-11-22 RX ORDER — METOPROLOL TARTRATE 50 MG
25 TABLET ORAL EVERY 8 HOURS
Refills: 0 | Status: DISCONTINUED | OUTPATIENT
Start: 2023-11-22 | End: 2023-11-23

## 2023-11-22 RX ORDER — THIAMINE MONONITRATE (VIT B1) 100 MG
100 TABLET ORAL DAILY
Refills: 0 | Status: DISCONTINUED | OUTPATIENT
Start: 2023-11-22 | End: 2023-11-28

## 2023-11-22 RX ORDER — INFLUENZA VIRUS VACCINE 15; 15; 15; 15 UG/.5ML; UG/.5ML; UG/.5ML; UG/.5ML
0.7 SUSPENSION INTRAMUSCULAR ONCE
Refills: 0 | Status: DISCONTINUED | OUTPATIENT
Start: 2023-11-22 | End: 2023-11-28

## 2023-11-22 RX ADMIN — Medication 100 MILLIGRAM(S): at 11:28

## 2023-11-22 RX ADMIN — MAGNESIUM OXIDE 400 MG ORAL TABLET 400 MILLIGRAM(S): 241.3 TABLET ORAL at 11:28

## 2023-11-22 RX ADMIN — Medication 40 MILLIEQUIVALENT(S): at 11:28

## 2023-11-22 RX ADMIN — Medication 25 MILLIGRAM(S): at 17:32

## 2023-11-22 RX ADMIN — Medication 1 MILLIGRAM(S): at 11:28

## 2023-11-22 NOTE — PROGRESS NOTE ADULT - SUBJECTIVE AND OBJECTIVE BOX
Cardiology PA Adult Progress Note    SUBJECTIVE ASSESSMENT: Overnight repeat CT Head negative for an acute bleed, patient laying comfortably in bed. Currently denies CP, dizziness, palpitations, SOB, dyspnea, coughing, headaches, N/V/D/abdominal pain. Patient understands plan for the day.   	  MEDICATIONS:  metoprolol tartrate 25 milliGRAM(s) Oral every 8 hours  acetaminophen     Tablet .. 650 milliGRAM(s) Oral every 6 hours PRN  folic acid 1 milliGRAM(s) Oral daily  influenza  Vaccine (HIGH DOSE) 0.7 milliLiter(s) IntraMuscular once  thiamine 100 milliGRAM(s) Oral daily    	  VITAL SIGNS:  T(C): 36.8 (11-22-23 @ 09:36), Max: 36.8 (11-22-23 @ 09:36)  HR: 98 (11-22-23 @ 09:36) (71 - 145)  BP: 100/62 (11-22-23 @ 11:25) (72/57 - 122/96)  RR: 18 (11-22-23 @ 09:36) (16 - 19)  SpO2: 97% (11-22-23 @ 09:36) (97% - 100%)  Wt(kg): --    I&O's Summary    21 Nov 2023 07:01  -  22 Nov 2023 07:00  --------------------------------------------------------  IN: 180 mL / OUT: 100 mL / NET: 80 mL      Height (cm): 166.4 (11-21 @ 20:48)  Weight (kg): 67.993 (11-21 @ 21:50)  BMI (kg/m2): 24.6 (11-21 @ 21:50)  BSA (m2): 1.76 (11-21 @ 21:50)                                     PHYSICAL EXAM:  Appearance: Normal	  HEENT: Normal oral mucosa, PERRL, EOMI	  Neck: Supple,  - JVD    Cardiovascular: Normal S1 S2, No murmurs  Respiratory: Lungs clear to auscultation No Rales, Rhonchi, Wheezing	  Gastrointestinal:  Soft, Non-tender, + BS	  Skin: No rashes, No ecchymoses, No cyanosis  Extremities: Normal range of motion, No clubbing, cyanosis or edema  Vascular: Peripheral pulses palpable 2+ bilaterally  Neurologic: Non-focal    MENTAL STATUS: AAOx3, memory intact, fund of knowledge appropriate  LANG/SPEECH: Naming and repetition intact, fluent, follows 3-step commands  CRANIAL NERVES:  II: Pupils equal and reactive, no RAPD, no VF deficits, normal fundus  III, IV, VI: EOM intact, no gaze preference or deviation, no nystagmus.  V: normal sensation in V1, V2, and V3 segments bilaterally  VII: no asymmetry, no nasolabial fold flattening  VIII: normal hearing to speech  IX, X: normal palatal elevation, no uvular deviation  XI: 5/5 head turn and 5/5 shoulder shrug bilaterally  XII: midline tongue protrusion  MOTOR:  5/5 muscle power in Rt shoulder abductors/adductors, elbow flexors/extensors, wrist flexors/extensors, finger abductors/adductors.  5/5 in Rt hipflexors/extensors, knee flexors/extensors, ankle dorsiflexors and planter flexors.    5/5 muscle power in Lt shoulder abductors/adductors, elbow flexors/extensors, wrist flexors/extensors, finger abductors/adductors.  5/5 in Lt hipflexors/extensors, knee flexors/extensors, ankle dorsiflexors and planter flexors.    REFLEXES: 2/4 throughout, bilateral flexor plantar response, no Thomas's, no clonus  SENSORY:  Normal to touch, pinprick, vibration, temp all limbs  No hemineglect, no extinction to double sided stimulation (visual & tactile)  Romberg absent  COORD: Normal finger to nose and heel to shin, no tremor, no dysmetria  STATION: normal stance, no truncal ataxia  GAIT: Unable to assess gait   Psychiatry: A & O x 3, Mood & affect appropriate    LABS:	 	                10.3   3.96  )-----------( 50       ( 22 Nov 2023 05:30 )             31.8     11-22    132<L>  |  102  |  33<H>  ----------------------------<  98  3.9   |  18<L>  |  1.35<H>    Ca    9.2      22 Nov 2023 05:30  Phos  2.3     11-21  Mg     1.6     11-22    TPro  6.8  /  Alb  3.4  /  TBili  0.7  /  DBili  0.2  /  AST  71<H>  /  ALT  25  /  AlkPhos  77  11-22    proBNP: 3990  Lipid Profile: LDL 96  HgA1c: 4.9  TSH: Thyroid Stimulating Hormone, Serum: 0.574 uIU/mL (11-22 @ 05:30)    PT/INR - ( 21 Nov 2023 21:32 )   PT: 11.3 sec;   INR: 0.99          PTT - ( 21 Nov 2023 21:32 )  PTT:21.7 sec

## 2023-11-22 NOTE — CONSULT NOTE ADULT - ASSESSMENT
72 y/o  female, retired [employer]  , who lives alone, denies any past medical history, (currently not taking medication) poor medical follow up  (has not seen a physician in years) presents to [facility] ER evening of 11/21/23, with syncope w/ head trauma, with prodrome symptoms of fatigue and lightheadedness, found to be in atrial flutter, course complicated by subacute/chronic subdural hematoma and pancytopenia.  EP consulted for atrial flutter with rates in 140s.       Recommendations:  ****        Final recommendations to be discussed with attending. 74 y/o  female, retired [employer]  , who lives alone, denies any past medical history, (currently not taking medication) poor medical follow up  (has not seen a physician in years) presents to [facility] ER evening of 11/21/23, with syncope w/ head trauma, with prodrome symptoms of fatigue and lightheadedness, found to be in atrial flutter, course complicated by subacute/chronic subdural hematoma and pancytopenia.  EP consulted for tachyarrythmia, likely typical aflutter      Recommendations:  - Continue rate control with bblocker  - Would trial AC as patient is cleared by neurosx and heme/onc  - F/u TTE results  - Likely DCCV prior to discharge as long as patient tolerates AC      Final recommendations to be discussed with attending.

## 2023-11-22 NOTE — PROGRESS NOTE ADULT - PROBLEM SELECTOR PLAN 3
- CTH 11/22 revealing subacute or chronic 4mm hematoma, unchanged from initial CTH   - NSGY states no contraindication to AC- if started on AC monitor for signs of mental status changes and if positive order repeat CTH no contrast and recall NSGY

## 2023-11-22 NOTE — CHART NOTE - NSCHARTNOTEFT_GEN_A_CORE
The CT Head were both reviewed on 11/21/23 and 11/22/23 and are officially read as no significant change in SDH. If indicated the patient may start anticoagulation per primary team. Please re-scan with CT head for any change in the patients neurological status.    Discussed with Dr. Randy D'Amico The CT Head on 11/21/23 and 11/22/23 were both reviewed and are officially read as no significant change in SDH. If indicated the patient may start anticoagulation per primary team. Please re-scan with CT head for any change in the patients neurological status.    Discussed with Dr. Randy D'Amico The CT Head on 11/21/23 and 11/22/23 were both reviewed and are officially read as no significant change in SDH. If indicated the patient may start anticoagulation per primary team. Please re-scan with CT head for any change in the patients neurological status.    Discussed with Dr. Randy D'Amico    Neurosurgery signing off. Please have the patient follow up outpatient with Dr. D'Amico.

## 2023-11-22 NOTE — PROGRESS NOTE ADULT - ASSESSMENT
72 y/o  female, denies any past medical history, (currently not taking medication) poor medical follow up  (has not seen a physician in 15-20 years) presents to [facility]  with recurrent syncope w/ head trauma, with prodrome symptoms of fatigue and lightheadedness found to be in new onset Atrial Flutter 2:1 . Course c/b pancytopenia with heme/onc on board to decide whether AC may be started      74 y/o  female, denies any past medical history, (currently not taking medication) poor medical follow up  (has not seen a physician in 15-20 years) presents to [facility]  with recurrent syncope w/ head trauma, with prodrome symptoms of fatigue and lightheadedness with CTH with stable 4mm hematoma, found to be in new onset Atrial Flutter 2:1 . Course c/b pancytopenia with heme/onc on board to decide whether AC may be started

## 2023-11-22 NOTE — PROGRESS NOTE ADULT - PROBLEM SELECTOR PLAN 5
Plts on admission 136  received Vanco and Zosyn in ER f/u blood cultures (no need to continue for now, afebrile and no leukocytosis) pt. denies fever or recent hx of URI  -F/U RVP panel including COVID-19, procalcitonin level  -Heme onc following: follow up labs- fibrinogen, platelets blue top, b12. folate, LDH, haptoglobin. Heme/onc fellow will review the smear themselves and will call back Plts on admission 136  received Vanco and Zosyn in ER f/u blood cultures (no need to continue for now, afebrile and no leukocytosis) pt. denies fever or recent hx of URI  - RVP panel including COVID-19 negative, procalcitonin level mildly elevated   -Heme onc following: follow up labs- fibrinogen, platelets blue top, b12. folate, LDH, haptoglobin. Heme/onc fellow will review the smear themselves and will call back

## 2023-11-22 NOTE — CONSULT NOTE ADULT - ASSESSMENT
If the patient requires anticoagulation, and provided there are no contraindications from a neurosurgical standpoint, she can be anticoagulated as long as her platelet count is greater than 50k.  Ongoing workup should not delay anticoagulation initiation.    Peripheral blood smear shows:   ~incomplete note 73F with no reported past medical history, not on medications, presenting with syncope, found to have SDH and new AFlutter, admitted to Cardiology for further workup. Hematology consulted for thrombocytopenia.     #thrombocytopenia  Plt on admission 136 --> 50 today, unknown baseline, but platelet count from CBCs is inaccurate. You can send a blue top for platelets.   Peripheral smear reviewed: Patient with normal appearing platelets, however significant platelet clumping observed; no abnormal WBCs noted, so smudge cells reported on automated diff is likely an artifact. Mild transaminitis noted.   If the patient requires anticoagulation, and provided there are no contraindications from a neurosurgical standpoint, she can be anticoagulated as long as her platelet count is greater than 50k.  Ongoing workup should not delay anticoagulation initiation.    No other bleeding except for SDH, which per neurology is stable.  -B12 within normal limits  -continue to monitor platelet counts    Patient discussed with Dr. Ríos and primary team

## 2023-11-22 NOTE — PHYSICAL THERAPY INITIAL EVALUATION ADULT - PERTINENT HX OF CURRENT PROBLEM, REHAB EVAL
Patient is a 73 year old female presents to [facility] ER 11/21/23, with syncope w/ head trauma, with prodrome symptoms of fatigue and lightheadedness.  In ER pt. noted to be in new onset Atrial Flutter 2:1 .  Pt. reports over the past year she had six episodes of unwitnessed  syncope with prodrome symptoms of fatigue and lightheadedness LOC approx 2-3 minutes, no bowel or urinary incontinence or AMS.  According to patient, she has not seen a physician for regular check ups  in years, (last PCP @[facility]  approx 20yrs ago)or for prior syncopal episodes.

## 2023-11-22 NOTE — PATIENT PROFILE ADULT - FALL HARM RISK - HARM RISK INTERVENTIONS
Assistance with ambulation/Assistance OOB with selected safe patient handling equipment/Communicate Risk of Fall with Harm to all staff/Discuss with provider need for PT consult/Monitor gait and stability/Reinforce activity limits and safety measures with patient and family/Tailored Fall Risk Interventions/Visual Cue: Yellow wristband and red socks/Bed in lowest position, wheels locked, appropriate side rails in place/Call bell, personal items and telephone in reach/Instruct patient to call for assistance before getting out of bed or chair/Non-slip footwear when patient is out of bed/[place] to call system/Physically safe environment - no spills, clutter or unnecessary equipment/Purposeful Proactive Rounding/Room/bathroom lighting operational, light cord in reach

## 2023-11-22 NOTE — PHYSICAL THERAPY INITIAL EVALUATION ADULT - ADDITIONAL COMMENTS
Patient reports she lives alone in elevator apartment with ramp to enter. PTA patient ambulated with no AD. She has tub shower with shower chair. Patient reports 10 falls in past 6 months.

## 2023-11-22 NOTE — PROGRESS NOTE ADULT - PROBLEM SELECTOR PLAN 1
States this occurs at least 1-2x every 2 weeks. Lives alone so episodes are unwitnessed   -CTHead w/o contrast 11/21: reveals No acute intracranial hemorrhage or calvarial fracture.Small hypodense collection over the left parietal convexity measures up to 4 mm in thickness and is suggestive of a subacute/chronic subdural hematoma.  Repeat CTH unchanged   - AC ok to be started by NSGY standpoint; discussed with LIZETTE Prather   -Heme/onc consulted for pancytopenia with presence of smudge cells; rule out CLL   - likely i/s/o dehydration   -TTE pending States this occurs at least 1-2x every 2 weeks. Lives alone so episodes are unwitnessed   -CTHead w/o contrast 11/21: reveals No acute intracranial hemorrhage or calvarial fracture.Small hypodense collection over the left parietal convexity measures up to 4 mm in thickness and is suggestive of a subacute/chronic subdural hematoma.  Repeat CTH unchanged   - AC ok to be started by NSGY standpoint   -Heme/onc consulted for pancytopenia with presence of smudge cells; rule out CLL   - likely i/s/o dehydration   -TTE pending

## 2023-11-22 NOTE — CONSULT NOTE ADULT - SUBJECTIVE AND OBJECTIVE BOX
HPI:    74 y/o  female, retired [employer]  , who lives alone,  denies any past medical history, (currently not taking medication) poor medical follow up  (has not seen a physician in years) presents to [facility] ER this evening 11/21/23, with syncope w/ head trauma, with prodrome symptoms of fatigue and lightheadedness.  In ER pt. noted to be in new onset Atrial Flutter 2:1 .  Pt. reports over the past year she had six episodes of unwitnessed  syncope with prodrome symptoms of fatigue and lightheadedness LOC approx 2-3 minutes, no bowel or urinary incontinence or AMS.  According to patient, she has not seen a physician for regular check ups  in years, (last PCP @[facility]  approx 20yrs ago)or for prior syncopal episodes.      In speaking with patient, she reports within the last year she has been experiencing multiple, six episodes of syncope with prodrome symptoms of lightheadedness with LOC for 2-3 minutes. This morning she was walking around in her apartment when suddenly she felt lightheaded with LOC and hitting her head on hard floor.   LOC for 2 minutes, no urinary or bowel incontinence  and mild confusion lasting a few seconds. Pt. has not seen a physician for approx 15-20yrs, although she reports when she has URI or acutely sick she goes to Urgent Care.    Last Colonoscopy was performed at [facility] clinic as routine check up which she reports was negative; last mammogram was 15 yrs ago.  She also reports occasional clear to milky white discharge from both breast nipples.     In ER ECG reveals new Atrial Flutter 2:1 with  and non specific ST-T wave changes, Troponin T High Sensitivity 46, [id number], H/H 11.5/34.0, Plts 136, WBC 8.36, Neutrophil 86,  Lactate 3.2, Na 133, BUN/Cr 41/1.89. K+3.8, Mg 1.7. Blood Glucose 196,  Lipase 261, AST 92, Phos 2.3  Patient received Diltiazem 15mg IV X1, decreasing 's, [id number] IV X1 and Zosyn 3.37 IV X1 for elevated Neutrophil, CXR AP prelim reveals no consolidation or  infiltrates no effusion.  CT of Head w/o contrast reveals No acute intracranial hemorrhage or calvarial fracture.Small hypodense collection over the left parietal convexity measures up to 4 mm in thickness and is suggestive of a subacute/chronic subdural hematoma.  NO AC initiated Neuro recommends no AC and repeat CT of head w/o contrast in 4 hours (2AM).  VSS:   Pt. denies fever, chills, HA, cough, chest pain, SOB, palpitations, diaphoresis, abdominal discomfort and n/v/d.     Pt. is admitted to [facility] for syncope work up, new Atrial Flutter, Renal Insufficiency (no baseline Bun/Cr 41/1.89), and further metabolic work up.  TTE in AM     Active issue: Syncope, new Atrial Flutter, Renal Insufficiency (no baseline; has not followed up with physician in years), Thrombocytopenia Plts 136 new DM2?    PAST MEDICAL & SURGICAL HISTORY:  No pertinent past medical history      S/P hysterectomy              Social History:no smoking, no drugs, no algohol    pertinent home medications:    Inpatient Medications:   acetaminophen     Tablet .. 650 milliGRAM(s) Oral every 6 hours PRN  folic acid 1 milliGRAM(s) Oral daily  influenza  Vaccine (HIGH DOSE) 0.7 milliLiter(s) IntraMuscular once  metoprolol tartrate 25 milliGRAM(s) Oral every 8 hours  thiamine 100 milliGRAM(s) Oral daily      Allergies: No Known Allergies      ROS:   CONSTITUTIONAL: No fever, weight loss + fatigue  EYES: Pt denies  RESPIRATORY: No cough, wheezing, chills or hemoptysis; No Shortness of Breath  CARDIOVASCULAR: see HPI  GASTROINTESTINAL: Pt denies  NEUROLOGICAL: Pt denies  SKIN: Pt denies   PSYCHIATRIC: Pt denies  HEME/LYMPH: Pt denies    PHYSICAL:  T(C): 36.8 (11-22-23 @ 09:36), Max: 36.8 (11-22-23 @ 09:36)  HR: 98 (11-22-23 @ 09:36) (71 - 145)  BP: 100/62 (11-22-23 @ 11:25) (72/57 - 122/96)  RR: 18 (11-22-23 @ 09:36) (16 - 19)  SpO2: 97% (11-22-23 @ 09:36) (97% - 100%)  Wt(kg): --    Appearance: No acute distress, well developed  Eyes: normal appearing conjunctiva, pupils and eyelids  Cardiovascular: Tachycardic. No JVD, No murmurs, No edema  Respiratory: Lungs clear to auscultation	bilaterally.  No wheeze, rhonchi, rales noted  Gastrointestinal:  Soft, NT/ND 	  Neurologic:  No deficit noted  Psych: A&Ox3, normal mood/affect  Musculoskeletal: normal gait  Skin: no rash noted, normal color and pigmentation.        LABS:                        10.3   3.96  )-----------( 50       ( 22 Nov 2023 05:30 )             31.8     11-22    132<L>  |  102  |  33<H>  ----------------------------<  98  3.9   |  18<L>  |  1.35<H>    Ca    9.2      22 Nov 2023 05:30  Phos  2.3     11-21  Mg     1.6     11-22    TPro  6.8  /  Alb  3.4  /  TBili  0.7  /  DBili  0.2  /  AST  71<H>  /  ALT  25  /  AlkPhos  77  11-22    PT/INR - ( 21 Nov 2023 21:32 )   PT: 11.3 sec;   INR: 0.99          PTT - ( 21 Nov 2023 21:32 )  PTT:21.7 sec  TSH 0.574  Troponin 37    EKG:     ECHO: pending    Prior EP procedures: N/A    Cath / stress / Cardiac CTa: N/A

## 2023-11-22 NOTE — CONSULT NOTE ADULT - SUBJECTIVE AND OBJECTIVE BOX
Hematology Consult Note    HPI:  A&O X3  Full Code Syncope with new Atrial Flutter, Renal Insufficiency, Metabolic workup      72 y/o  female, retired [employer]  , who lives alone,  denies any past medical history, (currently not taking medication) poor medical follow up  (has not seen a physician in years) presents to [facility] ER this evening 11/21/23, with syncope w/ head trauma, with prodrome symptoms of fatigue and lightheadedness.  In ER pt. noted to be in new onset Atrial Flutter 2:1 .  Pt. reports over the past year she had six episodes of unwitnessed  syncope with prodrome symptoms of fatigue and lightheadedness LOC approx 2-3 minutes, no bowel or urinary incontinence or AMS.  According to patient, she has not seen a physician for regular check ups  in years, (last PCP @[facility]  approx 20yrs ago)or for prior syncopal episodes.      In speaking with patient, she reports within the last year she has been experiencing multiple, six episodes of syncope with prodrome symptoms of lightheadedness with LOC for 2-3 minutes. This morning she was walking around in her apartment when suddenly she felt lightheaded with LOC and hitting her head on hard floor.   LOC for 2 minutes, no urinary or bowel incontinence  and mild confusion lasting a few seconds. Pt. has not seen a physician for approx 15-20yrs, although she reports when she has URI or acutely sick she goes to Urgent Care.    Last Colonoscopy was performed at [facility] clinic as routine check up which she reports was negative; last mammogram was 15 yrs ago.  She also reports occasional clear to milky white discharge from both breast nipples.     In ER ECG reveals new Atrial Flutter 2:1 with  and non specific ST-T wave changes, Troponin T High Sensitivity 46, [id number], H/H 11.5/34.0, Plts 136, WBC 8.36, Neutrophil 86,  Lactate 3.2, Na 133, BUN/Cr 41/1.89. K+3.8, Mg 1.7. Blood Glucose 196,  Lipase 261, AST 92, Phos 2.3  Patient received Diltiazem 15mg IV X1, decreasing 's, [id number] IV X1 and Zosyn 3.37 IV X1 for elevated Neutrophil, CXR AP prelim reveals no consolidation or  infiltrates no effusion.  CT of Head w/o contrast reveals No acute intracranial hemorrhage or calvarial fracture.Small hypodense collection over the left parietal convexity measures up to 4 mm in thickness and is suggestive of a subacute/chronic subdural hematoma.  NO AC initiated Neuro recommends no AC and repeat CT of head w/o contrast in 4 hours (2AM).  VSS:   Pt. denies fever, chills, HA, cough, chest pain, SOB, palpitations, diaphoresis, abdominal discomfort and n/v/d.     Pt. is admitted to [facility] for syncope work up, new Atrial Flutter, Renal Insufficiency (no baseline Bun/Cr 41/1.89), and further metabolic work up.  TTE in AM     Active issue: Syncope, new Atrial Flutter, Renal Insufficiency (no baseline; has not followed up with physician in years), Thrombocytopenia Plts 136 new DM2? (21 Nov 2023 23:34)      Allergies    No Known Allergies    Intolerances        MEDICATIONS  (STANDING):  folic acid 1 milliGRAM(s) Oral daily  influenza  Vaccine (HIGH DOSE) 0.7 milliLiter(s) IntraMuscular once  metoprolol tartrate 25 milliGRAM(s) Oral every 8 hours  thiamine 100 milliGRAM(s) Oral daily    MEDICATIONS  (PRN):  acetaminophen     Tablet .. 650 milliGRAM(s) Oral every 6 hours PRN Moderate Pain (4 - 6)      PAST MEDICAL & SURGICAL HISTORY:  No pertinent past medical history      S/P hysterectomy          FAMILY HISTORY: diabetes, no reported  history of blood disorders      SOCIAL HISTORY: Never smoker; has a glass of wine with meals occasionally; no drug use    REVIEW OF SYSTEMS:    CONSTITUTIONAL: No fevers or chills; +lightheadedness; +fatigue  EYES/ENT: No visual changes;  No vertigo or throat pain   NECK: No pain or stiffness  RESPIRATORY: No cough, wheezing, hemoptysis; No shortness of breath  CARDIOVASCULAR: No chest pain or palpitations  GASTROINTESTINAL: No abdominal or epigastric pain. No nausea, vomiting, or hematemesis; No diarrhea or constipation. No melena or hematochezia.  GENITOURINARY: No dysuria, frequency or hematuria  NEUROLOGICAL: No numbness or weakness  SKIN: No itching, burning, rashes, or lesions   All other review of systems is negative unless indicated above.    Height (cm): 166.4 (11-21 @ 20:48)  Weight (kg): 67.993 (11-21 @ 21:50)  BMI (kg/m2): 24.6 (11-21 @ 21:50)  BSA (m2): 1.76 (11-21 @ 21:50)    T(F): 98.2 (11-22-23 @ 09:36), Max: 98.2 (11-22-23 @ 09:36)  HR: 131 (11-22-23 @ 18:56)  BP: 86/53 (11-22-23 @ 18:56)  RR: 16 (11-22-23 @ 18:56)  SpO2: 98% (11-22-23 @ 18:56)  Wt(kg): --    GENERAL: NAD  HEAD:  Atraumatic, Normocephalic  EYES: EOMI, PERRLA, conjunctiva and sclera clear  NECK: Supple, No JVD  CHEST/LUNG: Clear to auscultation bilaterally; No wheeze  HEART: tachycardic  ABDOMEN: Soft, Nontender, Nondistended; Bowel sounds present  EXTREMITIES:  2+ Peripheral Pulses, No clubbing, cyanosis, or edema  NEUROLOGY: non-focal  SKIN: No rashes or lesions                          10.3   3.96  )-----------( 50       ( 22 Nov 2023 05:30 )             31.8       11-22    132<L>  |  102  |  33<H>  ----------------------------<  98  3.9   |  18<L>  |  1.35<H>    Ca    9.2      22 Nov 2023 05:30  Phos  2.3     11-21  Mg     1.6     11-22    TPro  6.8  /  Alb  3.4  /  TBili  0.7  /  DBili  0.2  /  AST  71<H>  /  ALT  25  /  AlkPhos  77  11-22      Magnesium: 1.6 mg/dL (11-22 @ 05:30)  Magnesium: 1.7 mg/dL (11-21 @ 21:32)  Phosphorus: 2.3 mg/dL (11-21 @ 21:32)

## 2023-11-22 NOTE — PROGRESS NOTE ADULT - PROBLEM SELECTOR PLAN 2
Paroxysmal atrial flutter with HR ranging from 50's when in SR to 140's while in flutter  TTE pending   Will hold AC for now until Heme/onc completes workup for pancytopenia   EP consulted; recommended rate control for now until heme/onc workup is completed  - TFT normal Paroxysmal atrial flutter with HR ranging from 50's when in SR to 140's while in flutter  Monitor for tachy-regina syndrome  TTE pending   Will hold AC for now until Heme/onc completes workup for pancytopenia   EP consulted; recommended rate control for now until heme/onc workup is completed  - TFT normal

## 2023-11-22 NOTE — CONSULT NOTE ADULT - SUBJECTIVE AND OBJECTIVE BOX
HISTORY OF PRESENT ILLNESS:   72 y/o F who denies PMH (infrequent PCP visits) presented the ED s/p fall and dizziness. Pt states she was getting up and felt like she could not balance herself and fell 2 days ago on her R side. She states she hit the R side of her head and her R arm. Pt states today at 1 pm she fell again after losing her balance and that her headache worsened and now has pain in her neck. She states that she took Aleve today which normally helps her but did not help her today. Pt states she was taking aspirin everyday last year but was told by her PCP 6 months ago to stop due to risk of increased bleeding. Pt notes her family is gone and now it being the holidays she has had decreased appetite.  She denies LOC, numbness, visual changes weakness, vertigo, CP, SOB, abd pain, n/v. Per ED pt was found to have a R subacute/chronic 4mm subdural hematoma, new onset of a-flutter, elevated BNP and elevated trop.    PAST MEDICAL & SURGICAL HISTORY:  No pertinent past medical history        FAMILY HISTORY:      SOCIAL HISTORY:  Tobacco Use: denies   EtOH use: 2-3 drinks a week  Substance: denies    Allergies    No Known Allergies    Intolerances        REVIEW OF SYSTEMS      General:	no recent illnesses    Skin/Breast:  intact  	  Ophthalmologic:  negative	  ENMT:	negative    Respiratory and Thorax: no coughing, wheezing, recent URI  	  Cardiovascular: no chest pain, CONTRERAS    Gastrointestinal:	soft, non tender    Genitourinary: no frequency, dysuria    Musculoskeletal:	negative    Neurological:	see HPI    Psychiatric:	negative    Hematology/Lymphatics:	negative    Endocrine:  	negative    Allergic/Immunologic:  Negative      MEDICATIONS:  Antibiotics:    Neuro:  acetaminophen     Tablet .. 650 milliGRAM(s) Oral every 6 hours PRN    Anticoagulation:    OTHER:  metoprolol tartrate 12.5 milliGRAM(s) Oral every 8 hours    IVF:  magnesium oxide 400 milliGRAM(s) Oral once  potassium chloride    Tablet ER 40 milliEquivalent(s) Oral once      Vital Signs Last 24 Hrs  T(C): 36.7 (2023 23:14), Max: 36.7 (2023 23:14)  T(F): 98 (2023 23:14), Max: 98 (2023 23:14)  HR: 71 (2023 23:14) (71 - 145)  BP: 116/69 (2023 23:14) (72/57 - 119/71)  BP(mean): --  RR: 18 (2023 23:14) (18 - 19)  SpO2: 100% (2023 23:14) (99% - 100%)    Parameters below as of 2023 23:14  Patient On (Oxygen Delivery Method): room air        PHYSICAL EXAM:  General - NAD, Alert and oriented x 3,   Eyes - PERRLA, EOM intact, visual fields intact  Neck - - No lymphadenopathy  Cardiovascular - tachycardia, no m/r/g, no JVD  Lungs - Clear to auscltation, no use of acessory muscles, no crackles or wheezes.  Skin - No rashes, skin warm and dry, no erythematous areas  Abdomen - Normal bowel sounds, abdomen soft and nontender  Genito Urinary – Genital exam not performed since complaints not related.  Extremeties - No edema, cyanosis or clubbing  Neurological – CN II-XII intact, follows commands, no aphasia, motor strength 5/5 in all extremities, sensation intact in all extremities, patellar reflex 2+    LABS:                        11.5   8.36  )-----------( 136      ( 2023 21:32 )             34.0     11-    133<L>  |  96  |  41<H>  ----------------------------<  196<H>  3.8   |  21<L>  |  1.89<H>    Ca    10.4      2023 21:32  Phos  2.3     11-  Mg     1.7     -    TPro  8.4<H>  /  Alb  4.3  /  TBili  1.1  /  DBili  0.3  /  AST  92<H>  /  ALT  31  /  AlkPhos  94  11-21    PT/INR - ( 2023 21:32 )   PT: 11.3 sec;   INR: 0.99          PTT - ( 2023 21:32 )  PTT:21.7 sec  Urinalysis Basic - ( 2023 23:39 )    Color: Dark Yellow / Appearance: Cloudy / S.026 / pH: x  Gluc: x / Ketone: Trace mg/dL  / Bili: Negative / Urobili: 1.0 mg/dL   Blood: x / Protein: 30 mg/dL / Nitrite: Negative   Leuk Esterase: Small / RBC: 11 /HPF / WBC 3 /HPF   Sq Epi: x / Non Sq Epi: 29 /HPF / Bacteria: Moderate /HPF      CULTURES:      RADIOLOGY & ADDITIONAL STUDIES:    Assessment:  72 y/o F with no PMH presented to the ED after a fall and dizziness. Pt was found to have subacute/chronic 4 mm subdural hematoma on CT and a new onset of a-flutter.    Plan:  - repeat scan 2-3 hours  - follow up outpatient with Dr. D'Amico    d/w Dr. D'Amico   HISTORY OF PRESENT ILLNESS:   72 y/o F who denies PMH (infrequent PCP visits) presented the ED c/o dizziness and frequent falls, last fall . Pt states she was getting up and felt like she could not balance herself and fell 2 days ago on her R side. She states she hit the R side of her head and her R arm. Pt states today at 1 pm she fell again after losing her balance and that her headache worsened and now has pain in her neck. She states that she took Aleve today which normally helps her but did not help her today. Pt states she was taking aspirin everyday last year but was told by her PCP 6 months ago to stop due to risk of increased bleeding. Pt notes her family is gone and now it being the holidays she has had decreased appetite.  She denies LOC, numbness, visual changes weakness, vertigo, CP, SOB, abd pain, n/v. PT was found to have a R subacute/chronic 4mm subdural hematoma, new onset of a-flutter, elevated BNP and elevated trop. NSGY consulted for SDH evaluation.     PAST MEDICAL & SURGICAL HISTORY:  No pertinent past medical history        FAMILY HISTORY:      SOCIAL HISTORY:  Tobacco Use: denies   EtOH use: 2-3 drinks a week  Substance: denies    Allergies    No Known Allergies    Intolerances        REVIEW OF SYSTEMS      General:	no recent illnesses    Skin/Breast:  intact  	  Ophthalmologic:  negative	  ENMT:	negative    Respiratory and Thorax: no coughing, wheezing, recent URI  	  Cardiovascular: no chest pain, CONTRERAS    Gastrointestinal:	soft, non tender    Genitourinary: no frequency, dysuria    Musculoskeletal:	negative    Neurological:	see HPI    Psychiatric:	negative    Hematology/Lymphatics:	negative    Endocrine:  	negative    Allergic/Immunologic:  Negative      MEDICATIONS:  Antibiotics:    Neuro:  acetaminophen     Tablet .. 650 milliGRAM(s) Oral every 6 hours PRN    Anticoagulation:    OTHER:  metoprolol tartrate 12.5 milliGRAM(s) Oral every 8 hours    IVF:  magnesium oxide 400 milliGRAM(s) Oral once  potassium chloride    Tablet ER 40 milliEquivalent(s) Oral once      Vital Signs Last 24 Hrs  T(C): 36.7 (2023 23:14), Max: 36.7 (2023 23:14)  T(F): 98 (2023 23:14), Max: 98 (2023 23:14)  HR: 71 (2023 23:14) (71 - 145)  BP: 116/69 (2023 23:14) (72/57 - 119/71)  BP(mean): --  RR: 18 (2023 23:14) (18 - 19)  SpO2: 100% (2023 23:14) (99% - 100%)    Parameters below as of 2023 23:14  Patient On (Oxygen Delivery Method): room air        PHYSICAL EXAM:  General - NAD, Alert and oriented x 3,   Eyes - PERRLA, EOM intact, visual fields intact  Neck - - No lymphadenopathy  Cardiovascular - tachycardia, no m/r/g, no JVD  Lungs - Clear to ascultation no use of accessory muscles, no crackles or wheezes.  Skin - No rashes, skin warm and dry, no erythematous areas  Abdomen - Normal bowel sounds, abdomen soft and nontender  Extremities - No edema, cyanosis or clubbing  Neurological – CN II-XII intact, follows commands, no aphasia, motor strength 5/5 in all extremities, sensation intact in all extremities, patellar reflex 2+ b/l    LABS:                        11.5   8.36  )-----------( 136      ( 2023 21:32 )             34.0     11-    133<L>  |  96  |  41<H>  ----------------------------<  196<H>  3.8   |  21<L>  |  1.89<H>    Ca    10.4      2023 21:32  Phos  2.3     11-  Mg     1.7     -    TPro  8.4<H>  /  Alb  4.3  /  TBili  1.1  /  DBili  0.3  /  AST  92<H>  /  ALT  31  /  AlkPhos  94  11-21    PT/INR - ( 2023 21:32 )   PT: 11.3 sec;   INR: 0.99          PTT - ( 2023 21:32 )  PTT:21.7 sec  Urinalysis Basic - ( 2023 23:39 )    Color: Dark Yellow / Appearance: Cloudy / S.026 / pH: x  Gluc: x / Ketone: Trace mg/dL  / Bili: Negative / Urobili: 1.0 mg/dL   Blood: x / Protein: 30 mg/dL / Nitrite: Negative   Leuk Esterase: Small / RBC: 11 /HPF / WBC 3 /HPF   Sq Epi: x / Non Sq Epi: 29 /HPF / Bacteria: Moderate /HPF      CULTURES:      RADIOLOGY & ADDITIONAL STUDIES:    Assessment:  72 y/o F with no PMH presented to the ED after a fall and dizziness. Pt was found to have subacute/chronic 4 mm subdural hematoma on CT and new onset a-flutter.    Recommendations:  - repeat CT head without contrast 4-6 hours after initial CT; NSGY to follow up     d/w Dr. D'Amico

## 2023-11-22 NOTE — CONSULT NOTE ADULT - NSCONSULTADDITIONALINFOA_GEN_ALL_CORE
I have personally seen, examined, and reviewed the above clinical note and all of its components and:  [ ] agree with the above assessment and plan without modifications.  [x] agree with the above with modifications made to the assessment and/or plan of care.  [ ] disagree with the above with significant modifications as listed below:

## 2023-11-22 NOTE — CONSULT NOTE ADULT - REASON FOR ADMISSION
syncope with prodrome of "lightheadedness"  Pt. noted to be in new Atrial Flutter 2:1 

## 2023-11-23 ENCOUNTER — TRANSCRIPTION ENCOUNTER (OUTPATIENT)
Age: 73
End: 2023-11-23

## 2023-11-23 LAB
-  STAPHYLOCOCCUS EPIDERMIDIS: SIGNIFICANT CHANGE UP
-  STAPHYLOCOCCUS EPIDERMIDIS: SIGNIFICANT CHANGE UP
ANION GAP SERPL CALC-SCNC: 8 MMOL/L — SIGNIFICANT CHANGE UP (ref 5–17)
ANION GAP SERPL CALC-SCNC: 8 MMOL/L — SIGNIFICANT CHANGE UP (ref 5–17)
BUN SERPL-MCNC: 25 MG/DL — HIGH (ref 7–23)
BUN SERPL-MCNC: 25 MG/DL — HIGH (ref 7–23)
CALCIUM SERPL-MCNC: 9.2 MG/DL — SIGNIFICANT CHANGE UP (ref 8.4–10.5)
CALCIUM SERPL-MCNC: 9.2 MG/DL — SIGNIFICANT CHANGE UP (ref 8.4–10.5)
CHLORIDE SERPL-SCNC: 106 MMOL/L — SIGNIFICANT CHANGE UP (ref 96–108)
CHLORIDE SERPL-SCNC: 106 MMOL/L — SIGNIFICANT CHANGE UP (ref 96–108)
CO2 SERPL-SCNC: 22 MMOL/L — SIGNIFICANT CHANGE UP (ref 22–31)
CO2 SERPL-SCNC: 22 MMOL/L — SIGNIFICANT CHANGE UP (ref 22–31)
CREAT SERPL-MCNC: 1.03 MG/DL — SIGNIFICANT CHANGE UP (ref 0.5–1.3)
CREAT SERPL-MCNC: 1.03 MG/DL — SIGNIFICANT CHANGE UP (ref 0.5–1.3)
CULTURE RESULTS: SIGNIFICANT CHANGE UP
CULTURE RESULTS: SIGNIFICANT CHANGE UP
EGFR: 57 ML/MIN/1.73M2 — LOW
EGFR: 57 ML/MIN/1.73M2 — LOW
GLUCOSE SERPL-MCNC: 130 MG/DL — HIGH (ref 70–99)
GLUCOSE SERPL-MCNC: 130 MG/DL — HIGH (ref 70–99)
GRAM STN FLD: ABNORMAL
HAPTOGLOB SERPL-MCNC: 115 MG/DL — SIGNIFICANT CHANGE UP (ref 34–200)
HAPTOGLOB SERPL-MCNC: 115 MG/DL — SIGNIFICANT CHANGE UP (ref 34–200)
HCT VFR BLD CALC: 26.2 % — LOW (ref 34.5–45)
HCT VFR BLD CALC: 26.2 % — LOW (ref 34.5–45)
HGB BLD-MCNC: 8.7 G/DL — LOW (ref 11.5–15.5)
HGB BLD-MCNC: 8.7 G/DL — LOW (ref 11.5–15.5)
LDH SERPL L TO P-CCNC: 352 U/L — HIGH (ref 50–242)
LDH SERPL L TO P-CCNC: 352 U/L — HIGH (ref 50–242)
MAGNESIUM SERPL-MCNC: 1.5 MG/DL — LOW (ref 1.6–2.6)
MAGNESIUM SERPL-MCNC: 1.5 MG/DL — LOW (ref 1.6–2.6)
MCHC RBC-ENTMCNC: 33.2 GM/DL — SIGNIFICANT CHANGE UP (ref 32–36)
MCHC RBC-ENTMCNC: 33.2 GM/DL — SIGNIFICANT CHANGE UP (ref 32–36)
MCHC RBC-ENTMCNC: 37 PG — HIGH (ref 27–34)
MCHC RBC-ENTMCNC: 37 PG — HIGH (ref 27–34)
MCV RBC AUTO: 111.5 FL — HIGH (ref 80–100)
MCV RBC AUTO: 111.5 FL — HIGH (ref 80–100)
METHOD TYPE: SIGNIFICANT CHANGE UP
METHOD TYPE: SIGNIFICANT CHANGE UP
NRBC # BLD: 0 /100 WBCS — SIGNIFICANT CHANGE UP (ref 0–0)
NRBC # BLD: 0 /100 WBCS — SIGNIFICANT CHANGE UP (ref 0–0)
PLATELET # BLD AUTO: 136 K/UL — LOW (ref 150–400)
PLATELET # BLD AUTO: 136 K/UL — LOW (ref 150–400)
POTASSIUM SERPL-MCNC: 3.6 MMOL/L — SIGNIFICANT CHANGE UP (ref 3.5–5.3)
POTASSIUM SERPL-MCNC: 3.6 MMOL/L — SIGNIFICANT CHANGE UP (ref 3.5–5.3)
POTASSIUM SERPL-SCNC: 3.6 MMOL/L — SIGNIFICANT CHANGE UP (ref 3.5–5.3)
POTASSIUM SERPL-SCNC: 3.6 MMOL/L — SIGNIFICANT CHANGE UP (ref 3.5–5.3)
RBC # BLD: 2.35 M/UL — LOW (ref 3.8–5.2)
RBC # BLD: 2.35 M/UL — LOW (ref 3.8–5.2)
RBC # FLD: 14.2 % — SIGNIFICANT CHANGE UP (ref 10.3–14.5)
RBC # FLD: 14.2 % — SIGNIFICANT CHANGE UP (ref 10.3–14.5)
SODIUM SERPL-SCNC: 136 MMOL/L — SIGNIFICANT CHANGE UP (ref 135–145)
SODIUM SERPL-SCNC: 136 MMOL/L — SIGNIFICANT CHANGE UP (ref 135–145)
SPECIMEN SOURCE: SIGNIFICANT CHANGE UP
SPECIMEN SOURCE: SIGNIFICANT CHANGE UP
WBC # BLD: 5.33 K/UL — SIGNIFICANT CHANGE UP (ref 3.8–10.5)
WBC # BLD: 5.33 K/UL — SIGNIFICANT CHANGE UP (ref 3.8–10.5)
WBC # FLD AUTO: 5.33 K/UL — SIGNIFICANT CHANGE UP (ref 3.8–10.5)
WBC # FLD AUTO: 5.33 K/UL — SIGNIFICANT CHANGE UP (ref 3.8–10.5)

## 2023-11-23 PROCEDURE — 99233 SBSQ HOSP IP/OBS HIGH 50: CPT

## 2023-11-23 PROCEDURE — 99232 SBSQ HOSP IP/OBS MODERATE 35: CPT

## 2023-11-23 RX ORDER — APIXABAN 2.5 MG/1
5 TABLET, FILM COATED ORAL
Refills: 0 | Status: DISCONTINUED | OUTPATIENT
Start: 2023-11-23 | End: 2023-11-28

## 2023-11-23 RX ORDER — DIPHENHYDRAMINE HCL 50 MG
25 CAPSULE ORAL ONCE
Refills: 0 | Status: COMPLETED | OUTPATIENT
Start: 2023-11-23 | End: 2023-11-23

## 2023-11-23 RX ORDER — METOPROLOL TARTRATE 50 MG
50 TABLET ORAL EVERY 8 HOURS
Refills: 0 | Status: DISCONTINUED | OUTPATIENT
Start: 2023-11-23 | End: 2023-11-25

## 2023-11-23 RX ORDER — MAGNESIUM SULFATE 500 MG/ML
2 VIAL (ML) INJECTION ONCE
Refills: 0 | Status: DISCONTINUED | OUTPATIENT
Start: 2023-11-23 | End: 2023-11-23

## 2023-11-23 RX ORDER — VANCOMYCIN HCL 1 G
1000 VIAL (EA) INTRAVENOUS ONCE
Refills: 0 | Status: COMPLETED | OUTPATIENT
Start: 2023-11-23 | End: 2023-11-23

## 2023-11-23 RX ORDER — POTASSIUM CHLORIDE 20 MEQ
40 PACKET (EA) ORAL ONCE
Refills: 0 | Status: COMPLETED | OUTPATIENT
Start: 2023-11-23 | End: 2023-11-23

## 2023-11-23 RX ORDER — METOPROLOL TARTRATE 50 MG
25 TABLET ORAL ONCE
Refills: 0 | Status: COMPLETED | OUTPATIENT
Start: 2023-11-23 | End: 2023-11-23

## 2023-11-23 RX ORDER — MAGNESIUM OXIDE 400 MG ORAL TABLET 241.3 MG
800 TABLET ORAL ONCE
Refills: 0 | Status: COMPLETED | OUTPATIENT
Start: 2023-11-23 | End: 2023-11-23

## 2023-11-23 RX ADMIN — Medication 25 MILLIGRAM(S): at 21:18

## 2023-11-23 RX ADMIN — APIXABAN 5 MILLIGRAM(S): 2.5 TABLET, FILM COATED ORAL at 19:02

## 2023-11-23 RX ADMIN — Medication 650 MILLIGRAM(S): at 11:18

## 2023-11-23 RX ADMIN — Medication 40 MILLIEQUIVALENT(S): at 11:09

## 2023-11-23 RX ADMIN — Medication 50 MILLIGRAM(S): at 19:02

## 2023-11-23 RX ADMIN — Medication 650 MILLIGRAM(S): at 12:18

## 2023-11-23 RX ADMIN — Medication 1 MILLIGRAM(S): at 11:09

## 2023-11-23 RX ADMIN — MAGNESIUM OXIDE 400 MG ORAL TABLET 800 MILLIGRAM(S): 241.3 TABLET ORAL at 16:00

## 2023-11-23 RX ADMIN — Medication 100 MILLIGRAM(S): at 11:09

## 2023-11-23 RX ADMIN — Medication 25 MILLIGRAM(S): at 02:41

## 2023-11-23 RX ADMIN — Medication 25 MILLIGRAM(S): at 11:09

## 2023-11-23 NOTE — DISCHARGE NOTE PROVIDER - NSDCMRMEDTOKEN_GEN_ALL_CORE_FT
apixaban 5 mg oral tablet: 1 tab(s) orally 2 times a day  Xarelto 10 mg oral tablet: 1 tab(s) orally once a day   apixaban 5 mg oral tablet: 1 tab(s) orally 2 times a day  folic acid 1 mg oral tablet: 1 tab(s) orally once a day  losartan 25 mg oral tablet: 1 tab(s) orally once a day  metoprolol succinate 25 mg oral tablet, extended release: 1 tab(s) orally once a day  spironolactone 25 mg oral tablet: 0.5 tab(s) orally once a day  thiamine 100 mg oral tablet: 1 tab(s) orally once a day

## 2023-11-23 NOTE — PROGRESS NOTE ADULT - ASSESSMENT
73F poor medical follow-up (has not seen physician in 15-20 yrs.) presenting with recurrent syncope with +head strike with associated prodrome sxs of fatigue and lightheadedness. Course c/b stable 4mm SDH, new-onsent Aflutter, and pancytopenia.

## 2023-11-23 NOTE — DISCHARGE NOTE PROVIDER - CARE PROVIDER_API CALL
D'Amico, Randy Scott  Neurosurgery  [address] [address]  Phone: [phone]  Fax: [phone]  Follow Up Time: 1 month    Jay Vallejo  Cardiology  [address] [place] [address], [address]  [place] [address]  Phone: [phone]  Fax: [phone]  Follow Up Time: 1 week   D'Amico, Randy Scott  Neurosurgery  [address] [address]  Phone: [phone]  Fax: [phone]  Follow Up Time: 1 month    Jay Vallejo  Cardiology  [address] [place] [address], [address]  [place] [address]  Phone: [phone]  Fax: [phone]  Follow Up Time: 1 week    Brennan Reina  Cardiac Electrophysiology  [address], [address] [place] [address]  Phone: [phone]  Fax: [phone]  Follow Up Time: 1 month

## 2023-11-23 NOTE — PROGRESS NOTE ADULT - PROBLEM SELECTOR PLAN 1
- Hx of recurrent syncope (1-2x every 2 weeks). Lives alone so episodes are unwitnessed   - HEAD CT (11/21/23): Small 4mm hypodense collection over left parietal convexity suggestive of a subacute/chronic subdural hematoma.  No acute ICH/calvarial fx. Repeat CT Head unchanged   - AC ok to be started per NSGY standpoint   - Neuro following, appreciate recs

## 2023-11-23 NOTE — DISCHARGE NOTE PROVIDER - HOSPITAL COURSE
LAST UPDATED 11/23/23    73F poor medical follow-up (has not seen physician in 15-20 yrs.) presenting with recurrent syncope with +head strike with associated prodrome sxs of fatigue and lightheadedness.     While in ED, pt noted to be in Aflutter 2:1 with RVR to 140s with labs on admission notable for elevated BNP 4K, Lactate 3.2, CHARITY with Cr 1.89, and thrombocytopenia with Plt 130. Pt received Diltiazem 15mg IV x1 with slight improvement of HRs to 110s. Additionally, pt also noted to have neutrophilia with Auto Neutrophil 86% though with normal WBC and afebrile; pt received Vanc & Zosyn x1. Further work-up noted Head CT with small 4mm hypodense collection over the Lt parietal convexity c/f subacute/chronic SDH; otherwise no acute ICH or calvarial fx; repeat stability Head CT remains unchanged. NSGY consulted and gave clearance for AC initiation given stable SDH and Head CTs. Pt was then admitted to [facility] for further monitoring and management.     While at [facility], EP consulted for Aflutter RVR and pt was started on Metoprolol tartrate for rate control. TTE (11/22/23): EF 30-35% with mod-sev TR; BIAE. Given thrombocytopenia on admission, Heme was consulted. Anemia panel (Iron, B12, Folate) and LDH WNL.     Pt seen and examined at bedside this AM without any complaints or events overnight, VSS, labs and telemetry reviewed and pt stable for discharge as discussed with  ___. Pt has received appropriate discharge instructions, including medication regimen and follow up with  __ in 1-2 weeks.    Discharge Wt:  Discharge Cr:    CHF REGIMEN UPON DISCHARGE:    CV REGIMEN UPON DISCHARGE:               LAST UPDATED 11/24/23    73F poor medical follow-up (has not seen physician in 15-20 yrs.) presenting with recurrent syncope with +head strike with associated prodrome sxs of fatigue and lightheadedness.     While in ED, pt noted to be in Aflutter 2:1 with RVR to 140s with labs on admission notable for elevated BNP 4K, Lactate 3.2, CHARITY with Cr 1.89, and thrombocytopenia with Plt 130. Pt received Diltiazem 15mg IV x1 with slight improvement of HRs to 110s. Additionally, pt also noted to have neutrophilia with Auto Neutrophil 86% though with normal WBC and afebrile; pt received Vanc & Zosyn x1. Further work-up noted Head CT with small 4mm hypodense collection over the Lt parietal convexity c/f subacute/chronic SDH; otherwise no acute ICH or calvarial fx; repeat stability Head CT remains unchanged. NSGY consulted and gave clearance for AC initiation given stable SDH and Head CTs. Pt was then admitted to [facility] for further monitoring and management.     While at [facility], EP consulted for Aflutter RVR and pt was started on Metoprolol tartrate for rate control. TTE (11/22/23): EF 30-35% with mod-sev TR; BIAE. Given thrombocytopenia on admission, Heme was consulted. Anemia panel (Iron, B12, Folate) and LDH WNL. On 11/24, pt's PLT stabilized to 130s and Heme gave clearance for AC initiation; Eliquis 5mg BID was started.     On 11/24/23, pt underwent ANGELA which was negative for LA/RA/RAFFAELE/RAA thrombus and an ablation with EP which ___.     Pt seen and examined at bedside this AM without any complaints or events overnight, VSS, labs and telemetry reviewed and pt stable for discharge as discussed with  ___. Pt has received appropriate discharge instructions, including medication regimen and follow up with  __ in 1-2 weeks.    Discharge Wt:  Discharge Cr:    CHF REGIMEN UPON DISCHARGE:    CV REGIMEN UPON DISCHARGE:               LAST UPDATED 11/25/23    73F poor medical follow-up (has not seen physician in 15-20 yrs.) presenting with recurrent syncope with +head strike with associated prodrome sxs of fatigue and lightheadedness.     While in ED, pt noted to be in Aflutter 2:1 with RVR to 140s with labs on admission notable for elevated BNP 4K, Lactate 3.2, CHARITY with Cr 1.89, and thrombocytopenia with Plt 130. Pt received Diltiazem 15mg IV x1 with slight improvement of HRs to 110s. Additionally, pt also noted to have neutrophilia with Auto Neutrophil 86% though with normal WBC and afebrile; pt received Vanc & Zosyn x1. Further work-up noted Head CT with small 4mm hypodense collection over the Lt parietal convexity c/f subacute/chronic SDH; otherwise no acute ICH or calvarial fx; repeat stability Head CT remains unchanged. NSGY consulted and gave clearance for AC initiation given stable SDH and Head CTs. Pt was then admitted to [facility] for further monitoring and management.     While at [facility], EP consulted for Aflutter RVR and pt was started on Metoprolol tartrate for rate control. TTE (11/22/23): EF 30-35% with mod-sev TR; BIAE. Given thrombocytopenia on admission, Heme was consulted. Anemia panel (Iron, B12, Folate) and LDH WNL. On 11/24, pt's PLT stabilized to 130s and Heme gave clearance for AC initiation; Eliquis 5mg BID was started.     On 11/24/23, pt underwent ANGELA which was negative for LA/RA/RAFFAELE/RAA thrombus and an ablation with EP; pt now currently NSR with frequent PACs.     Pt seen and examined at bedside this AM without any complaints or events overnight, VSS, labs and telemetry reviewed and pt stable for discharge as discussed with  ___. Pt has received appropriate discharge instructions, including medication regimen and follow up with  __ in 1-2 weeks.    Discharge Wt:  Discharge Cr:    CHF REGIMEN UPON DISCHARGE:  Beta-blocker: Metoprolol XL 50mg QD  ACE/ARB/ARNI: Losartan 25mg QD -- can consider transitioning to Entresto as outpatient   MRA: None -- can consider starting Spironolactone as outpatient   SGLT2-I: None -- can consider starting Farxiga as outpatient     AFlutter AC: Eliquis 5mg BID                LAST UPDATED 11/25/23    73F poor medical follow-up (has not seen physician in 15-20 yrs.) presenting with recurrent syncope with +head strike with associated prodrome sxs of fatigue and lightheadedness.     While in ED, pt noted to be in Aflutter 2:1 with RVR to 140s with labs on admission notable for elevated BNP 4K, Lactate 3.2, CHARITY with Cr 1.89, and thrombocytopenia with Plt 130. Pt received Diltiazem 15mg IV x1 with slight improvement of HRs to 110s. Additionally, pt also noted to have neutrophilia with Auto Neutrophil 86% though with normal WBC and afebrile; pt received Vanc & Zosyn x1. Further work-up noted Head CT with small 4mm hypodense collection over the Lt parietal convexity c/f subacute/chronic SDH; otherwise no acute ICH or calvarial fx; repeat stability Head CT remains unchanged. NSGY consulted and gave clearance for AC initiation given stable SDH and Head CTs. Pt was then admitted to [facility] for further monitoring and management.     While at  [facility], EP consulted for Aflutter RVR and pt was started on Metoprolol tartrate for rate control. TTE (11/22/23): EF 30-35% with mod-sev TR; BIAE -- newly-reduced EF likely tachy-mediated. Given thrombocytopenia on admission, Heme was consulted. Anemia panel (Iron, B12, Folate) and LDH WNL. On 11/24, pt's PLT stabilized to 130s and Heme gave clearance for AC initiation; Eliquis 5mg BID was started.     On 11/24/23, pt underwent ANGELA which was negative for LA/RA/RAFFAELE/RAA thrombus and an ablation with EP; pt now currently NSR with frequent PACs.     Pt seen and examined at bedside this AM without any complaints or events overnight, VSS, labs and telemetry reviewed and pt stable for discharge as discussed with  ___. Pt has received appropriate discharge instructions, including medication regimen and follow up with  __ in 1-2 weeks.    Discharge Wt:  Discharge Cr:    CHF REGIMEN UPON DISCHARGE:  Beta-blocker: Metoprolol XL 50mg QD  ACE/ARB/ARNI: Losartan 25mg QD -- can consider transitioning to Entresto as outpatient   MRA: None -- can consider starting Spironolactone as outpatient   SGLT2-I: None -- can consider starting Farxiga as outpatient     AFlutter AC: Eliquis 5mg BID     OUTPATIENT WORK-UPS NEEDED:  Repeat CBC at 1 week follow-up   Repeat TTE in 3 months          LAST UPDATED 11/25/23    73F poor medical follow-up (has not seen physician in 15-20 yrs.) presenting with recurrent syncope with +head strike with associated prodrome sxs of fatigue and lightheadedness.     While in ED, pt noted to be in Aflutter 2:1 with RVR to 140s with labs on admission notable for elevated BNP 4K, Lactate 3.2, CHARITY with Cr 1.89, and thrombocytopenia with Plt 130. Pt received Diltiazem 15mg IV x1 with slight improvement of HRs to 110s. Additionally, pt also noted to have neutrophilia with Auto Neutrophil 86% though with normal WBC and afebrile; pt received Vanc & Zosyn x1. Further work-up noted Head CT with small 4mm hypodense collection over the Lt parietal convexity c/f subacute/chronic SDH; otherwise no acute ICH or calvarial fx; repeat stability Head CT remains unchanged. NSGY consulted and gave clearance for AC initiation given stable SDH and Head CTs. Pt was then admitted to [facility] for further monitoring and management.     While at  [facility], EP consulted for Aflutter RVR and pt was started on Metoprolol tartrate for rate control. TTE (11/22/23): EF 30-35% with mod-sev TR; BIAE -- newly-reduced EF likely tachy-mediated. Given thrombocytopenia on admission, Heme was consulted. Anemia panel (Iron, B12, Folate) and LDH WNL. On 11/24, pt's PLT stabilized to 130s and Heme gave clearance for AC initiation; Eliquis 5mg BID was started.     On 11/24/23, pt underwent ANGELA which was negative for LA/RA/RAFFAELE/RAA thrombus and an ablation with EP; pt now currently NSR with frequent PACs.     Pt seen and examined at bedside this AM without any complaints or events overnight, VSS, labs and telemetry reviewed and pt stable for discharge as discussed with  ___. Pt has received appropriate discharge instructions, including medication regimen and follow up with  __ in 1-2 weeks.  Physical Therapy assessed patient twice during hospital admission and recommended SAVANNA placement, patient refused SAVANNA placement.  Patient will receive Eliquis 5 mg PO BID for 1 month, with coupon, as copay would be >$400. Patient will follow up outpatient with Dr. Vallejo for initiation of continued AC as pt needs to be on AC for 1 month after ablation.     Discharge Wt:  Discharge Cr:    CHF REGIMEN UPON DISCHARGE:  Beta-blocker: Metoprolol XL 50mg QD  ACE/ARB/ARNI: Losartan 25mg QD -- can consider transitioning to Entresto as outpatient   MRA: None -- can consider starting Spironolactone as outpatient   SGLT2-I: None -- can consider starting Farxiga as outpatient     AFlutter AC: Eliquis 5mg BID     OUTPATIENT WORK-UPS NEEDED:  Repeat CBC at 1 week follow-up   Repeat TTE in 3 months          LAST UPDATED 11/27/23    73F poor medical follow-up (has not seen physician in 15-20 yrs.) presenting with recurrent syncope with +head strike with associated prodrome sxs of fatigue and lightheadedness.     While in ED, pt noted to be in Aflutter 2:1 with RVR to 140s with labs on admission notable for elevated BNP 4K, Lactate 3.2, CHARITY with Cr 1.89, and thrombocytopenia with Plt 130. Pt received Diltiazem 15mg IV x1 with slight improvement of HRs to 110s. Additionally, pt also noted to have neutrophilia with Auto Neutrophil 86% though with normal WBC and afebrile; pt received Vanc & Zosyn x1. Further work-up noted Head CT with small 4mm hypodense collection over the Lt parietal convexity c/f subacute/chronic SDH; otherwise no acute ICH or calvarial fx; repeat stability Head CT remains unchanged. NSGY consulted and gave clearance for AC initiation given stable SDH and Head CTs. Pt was then admitted to [facility] for further monitoring and management.     While at  [facility], EP consulted for Aflutter RVR and pt was started on Metoprolol tartrate for rate control. TTE (11/22/23): EF 30-35% with mod-sev TR; BIAE -- newly-reduced EF likely tachy-mediated. Given thrombocytopenia on admission, Heme was consulted. Anemia panel (Iron, B12, Folate) and LDH WNL. On 11/24, pt's PLT stabilized to 130s and Heme gave clearance for AC initiation; Eliquis 5mg BID was started.     On 11/24/23, pt underwent ANGELA which was negative for LA/RA/RAFFAELE/RAA thrombus and an ablation with EP; pt now currently NSR with frequent PACs.     Pt seen and examined at bedside this AM without any complaints or events overnight, VSS, labs and telemetry reviewed and pt stable for discharge as discussed with  ___. Pt has received appropriate discharge instructions, including medication regimen and follow up with  __ in 1-2 weeks.  Physical Therapy assessed patient twice during hospital admission and recommended SAVANNA placement, patient refused SAVANNA placement.  Patient will receive Eliquis 5 mg PO BID for 1 month, with coupon, as copay would be >$400. Patient will follow up outpatient with Dr. Vallejo for initiation of continued AC as pt needs to be on AC for 1 month after ablation.     Discharge Wt:  Discharge Cr:    CHF REGIMEN UPON DISCHARGE:  Beta-blocker: Metoprolol XL 50mg QD  ACE/ARB/ARNI: Losartan 25mg QD -- can consider transitioning to Entresto as outpatient   MRA: None -- can consider starting Spironolactone as outpatient   SGLT2-I: None -- can consider starting Farxiga as outpatient     AFlutter AC: Eliquis 5mg BID     OUTPATIENT WORK-UPS NEEDED:  Repeat CBC at 1 week follow-up   Repeat TTE in 3 months          LAST UPDATED 11/27/23    73F poor medical follow-up (has not seen physician in 15-20 yrs.) presenting with recurrent syncope with +head strike with associated prodrome sxs of fatigue and lightheadedness.     While in ED, pt noted to be in Aflutter 2:1 with RVR to 140s with labs on admission notable for elevated BNP 4K, Lactate 3.2, CHARITY with Cr 1.89, and thrombocytopenia with Plt 130. Pt received Diltiazem 15mg IV x1 with slight improvement of HRs to 110s. Additionally, pt also noted to have neutrophilia with Auto Neutrophil 86% though with normal WBC and afebrile; pt received Vanc & Zosyn x1. Further work-up noted Head CT with small 4mm hypodense collection over the Lt parietal convexity c/f subacute/chronic SDH; otherwise no acute ICH or calvarial fx; repeat stability Head CT remains unchanged. NSGY consulted and gave clearance for AC initiation given stable SDH and Head CTs. Pt was then admitted to [facility] for further monitoring and management.     While at  [facility], EP consulted for Aflutter RVR and pt was started on Metoprolol tartrate for rate control. TTE (11/22/23): EF 30-35% with mod-sev TR; BIAE -- newly-reduced EF likely tachy-mediated. Given thrombocytopenia on admission, Heme was consulted. Anemia panel (Iron, B12, Folate) and LDH WNL. On 11/24, pt's PLT stabilized to 130s and Heme gave clearance for AC initiation; Eliquis 5mg BID was started.     On 11/24/23, pt underwent ANGELA which was negative for LA/RA/RFAFAELE/RAA thrombus and an ablation with EP; pt now currently NSR with frequent PACs.     Pt seen and examined at bedside this AM without any complaints or events overnight, VSS, labs and telemetry reviewed and pt stable for discharge as discussed with Dr. Ramirez Pt has received appropriate discharge instructions, including medication regimen and follow up with  __ in 1-2 weeks.  Physical Therapy assessed patient twice during hospital admission and recommended SAVANNA placement, patient refused SAVANNA placement.  Patient will receive Eliquis 5 mg PO BID for 1 month, with coupon, as copay would be >$400. Patient will follow up outpatient with Dr. Vallejo for initiation of continued AC as pt needs to be on AC for 1 month after ablation.     Discharge Wt:  Discharge Cr:    CHF REGIMEN UPON DISCHARGE:  Beta-blocker: Metoprolol XL 25mg QD  ACE/ARB/ARNI: Losartan 25mg QD -- can consider transitioning to Entresto as outpatient   MRA: 12.5 mg daily   SGLT2-I: None -- can consider starting Farxiga as outpatient     AFlutter AC: Eliquis 5mg BID ( 1 free month )    OUTPATIENT WORK-UPS NEEDED:  Repeat CBC at 1 week follow-up   Repeat TTE in 3 months   CT Head for any changes with Neuro Outpt         LAST UPDATED 11/27/23    73F poor medical follow-up (has not seen physician in 15-20 yrs.) presenting with recurrent syncope with +head strike with associated prodrome sxs of fatigue and lightheadedness.     While in ED, pt noted to be in Aflutter 2:1 with RVR to 140s with labs on admission notable for elevated BNP 4K, Lactate 3.2, CHARITY with Cr 1.89, and thrombocytopenia with Plt 130. Pt received Diltiazem 15mg IV x1 with slight improvement of HRs to 110s. Additionally, pt also noted to have neutrophilia with Auto Neutrophil 86% though with normal WBC and afebrile; pt received Vanc & Zosyn x1. Further work-up noted Head CT with small 4mm hypodense collection over the Lt parietal convexity c/f subacute/chronic SDH; otherwise no acute ICH or calvarial fx; repeat stability Head CT remains unchanged. NSGY consulted and gave clearance for AC initiation given stable SDH and Head CTs. Pt was then admitted to [facility] for further monitoring and management.     While at  [facility], EP consulted for Aflutter RVR and pt was started on Metoprolol tartrate for rate control. TTE (11/22/23): EF 30-35% with mod-sev TR; BIAE -- newly-reduced EF likely tachy-mediated. Given thrombocytopenia on admission, Heme was consulted. Anemia panel (Iron, B12, Folate) and LDH WNL. On 11/24, pt's PLT stabilized to 130s and Heme gave clearance for AC initiation; Eliquis 5mg BID was started.     On 11/24/23, pt underwent ANGELA which was negative for LA/RA/RAFFAELE/RAA thrombus and an ablation with EP; pt now currently NSR with frequent PACs.     Pt seen and examined at bedside this AM without any complaints or events overnight, VSS, labs and telemetry reviewed and pt stable for discharge as discussed with Dr. Ramirez Pt has received appropriate discharge instructions, including medication regimen and follow up with Dr. Vallejo 11/30/23 @ 2pm. Patient was recommended for SAVANNA placement and she refused multiple times. She expresses understanding of the importance of medication as well as follow up. Physical Therapy assessed patient twice during hospital admission and recommended SAVANNA placement, for which patient refused.    Patient will receive Eliquis 5 mg PO BID for 1 month, with coupon, as copay would be >$400. Patient will follow up outpatient with Dr. Vallejo for initiation of continued AC as pt needs to be on AC for 1 month after ablation.       CHF REGIMEN UPON DISCHARGE:  Beta-blocker: Metoprolol XL 25mg QD  ACE/ARB/ARNI: Losartan 25mg QD -- can consider transitioning to Entresto as outpatient   MRA: 12.5 mg daily   SGLT2-I: None -- can consider starting Farxiga as outpatient     AFlutter AC: Eliquis 5mg BID ( 1 free month )    OUTPATIENT WORK-UPS NEEDED:  Repeat CBC at 1 week follow-up   Repeat TTE in 3 months   CT Head for any changes with Neuro Outpt           73F poor medical follow-up (has not seen physician in 15-20 yrs.) presenting with recurrent syncope with +head strike with associated prodrome sxs of fatigue and lightheadedness.     While in ED, pt noted to be in Aflutter 2:1 with RVR to 140s with labs on admission notable for elevated BNP 4K, Lactate 3.2, CHARITY with Cr 1.89, and thrombocytopenia with Plt 130. Pt received Diltiazem 15mg IV x1 with slight improvement of HRs to 110s. Additionally, pt also noted to have neutrophilia with Auto Neutrophil 86% though with normal WBC and afebrile; pt received Vanc & Zosyn x1. Further work-up noted Head CT with small 4mm hypodense collection over the Lt parietal convexity c/f subacute/chronic SDH; otherwise no acute ICH or calvarial fx; repeat stability Head CT remains unchanged. NSGY consulted and gave clearance for AC initiation given stable SDH and Head CTs. Pt was then admitted to [facility] for further monitoring and management.     While at  [facility], EP consulted for Aflutter RVR and pt was started on Metoprolol tartrate for rate control. TTE (11/22/23): EF 30-35% with mod-sev TR; BIAE -- newly-reduced EF likely tachy-mediated. Given thrombocytopenia on admission, Heme was consulted. Anemia panel (Iron, B12, Folate) and LDH WNL. On 11/24, pt's PLT stabilized to 130s and Heme gave clearance for AC initiation; Eliquis 5mg BID was started.     On 11/24/23, pt underwent ANGELA which was negative for LA/RA/RAFFAELE/RAA thrombus and an ablation with EP; pt now currently NSR with frequent PACs.     Pt seen and examined at bedside this AM without any complaints or events overnight, VSS, labs and telemetry reviewed and pt stable for discharge as discussed with Dr. Ramirez Pt has received appropriate discharge instructions, including medication regimen and follow up with Dr. Vallejo 11/30/23 @ 2pm. Patient was recommended for SAVANNA placement and she refused multiple times. She expresses understanding of the importance of medication as well as follow up.  Pt to follow up with Neuro, EP and Cards. Physical Therapy assessed patient twice during hospital admission and recommended SAVANNA placement, for which patient refused.    Patient will receive Eliquis 5 mg PO BID for 1 month, with coupon, as copay would be >$400. Patient will follow up outpatient with Dr. Vallejo for initiation of continued AC as pt needs to be on AC for 1 month after ablation.       CHF REGIMEN UPON DISCHARGE:  Beta-blocker: Metoprolol XL 25mg QD  ACE/ARB/ARNI: Losartan 25mg QD -- can consider transitioning to Entresto as outpatient   MRA: 12.5 mg daily   SGLT2-I: None -- can consider starting Farxiga as outpatient     AFlutter AC: Eliquis 5mg BID ( 1 free month )    OUTPATIENT WORK-UPS NEEDED:  Repeat CBC at 1 week follow-up   Repeat TTE in 3 months   CT Head for any changes with Neuro Outpt

## 2023-11-23 NOTE — DISCHARGE NOTE PROVIDER - NSDCCPCAREPLAN_GEN_ALL_CORE_FT
PRINCIPAL DISCHARGE DIAGNOSIS  Diagnosis: Atrial flutter  Assessment and Plan of Treatment: Atrial flutter is a type of heart rhythm disorder (arrhythmia) caused by problems in the heart's electrical system. Atrial flutter occurs when the chambers beat faster than normal and not always in coordination.   People with atrial flutter may not have symptoms. However, the disorder can increase the risk of stroke, heart failure and other complications. There are effective treatments for atrial flutter, including medication or procedures designed to scar small areas of heart tissue (ablation).  Please continue to take your ___ as listed.   Please follow-up with the EP Clinic within 1-2 weeks.        SECONDARY DISCHARGE DIAGNOSES  Diagnosis: Traumatic subdural hematoma with loss of consciousness of 30 minutes or less  Assessment and Plan of Treatment: Given that you had a fall and a hit your head, you underwent a Head CT which noted a small subdural hematoma.  A subdural hematoma is a buildup of blood on the surface of the brain. The blood builds up in a space between the protective layers that surround your brain.  The most common cause for a subdural hematoma is head injury. This can be from a car crash, fall, or violent attack. This sudden impact can strain the blood vessels within the dura, causing them to rip and bleed.   Currently, your subdural hematoma is small and only needs monitoring. However, if you are to experience the following: balance/walking problems, confusion, dizziness, headache, nausea, vomitting, losing consciousness, speech problems, vision problems, weakness, or numbness - please go to the emergency room.    Diagnosis: Thrombocytopenia  Assessment and Plan of Treatment:      PRINCIPAL DISCHARGE DIAGNOSIS  Diagnosis: Atrial flutter  Assessment and Plan of Treatment: Atrial flutter is a type of heart rhythm disorder (arrhythmia) caused by problems in the heart's electrical system. Atrial flutter occurs when the chambers beat faster than normal and not always in coordination.   People with atrial flutter may not have symptoms. However, the disorder can increase the risk of stroke, heart failure and other complications. There are effective treatments for atrial flutter, including medication or procedures designed to scar small areas of heart tissue (ablation).  Please continue to take your ___ as listed.   Please follow-up with the EP Clinic within 1-2 weeks.        SECONDARY DISCHARGE DIAGNOSES  Diagnosis: Traumatic subdural hematoma with loss of consciousness of 30 minutes or less  Assessment and Plan of Treatment: Given that you had a fall and a hit your head, you underwent a Head CT which noted a small subdural hematoma.  A subdural hematoma is a buildup of blood on the surface of the brain. The blood builds up in a space between the protective layers that surround your brain.  The most common cause for a subdural hematoma is head injury. This can be from a car crash, fall, or violent attack. This sudden impact can strain the blood vessels within the dura, causing them to rip and bleed.   Currently, your subdural hematoma is small and only needs monitoring. However, if you are to experience the following: balance/walking problems, confusion, dizziness, headache, nausea, vomitting, losing consciousness, speech problems, vision problems, weakness, or numbness - please go to the emergency room.    Diagnosis: HFrEF (heart failure with reduced ejection fraction)  Assessment and Plan of Treatment: You have a weak heart, also known as Congestive Heart Failure (CHF). Heart failure is a condition in which the heart does not pump or fill with blood well. As a result, the heart lags behind in its job of moving blood throughout the body. This can lead to symptoms such as swelling, trouble breathing, and feeling tired. Your Ejection Fraction (EF) is 30-35%, a normal EF is 55-60%.  -Please continue _____ to prevent fluid build up in the body.  -Please continue ___ to help strenghten your heart muscle  -Avoid drinking more than 1.5L of fluid daily and maintain a low salt diet (max 2grams daily).  -Please weigh yourself daily, for any significant increases in daily weight of 2lbs/day or 5lbs/week with associated swelling in the legs or abdomen and/or shortness of breath, please call your doctor or go to the emergency room.  -Follow up with  __ in 1 week.

## 2023-11-23 NOTE — DISCHARGE NOTE PROVIDER - ATTENDING DISCHARGE PHYSICAL EXAMINATION:
72 y/o F poor medical follow-up (has not seen physician in 15-20 yrs.) presenting with recurrent syncope with +head strike with associated prodrome sxs of fatigue and lightheadedness. Course c/b stable 4mm SDH, new-onsent Aflutter RVR, and thrombocytopenia. Now s/p Aflutter ablation and currently in NSR.     1. AFlutter  s/p ablation, on NSR. Toprol 25 mg xl.  Continue eliquis--- coumadin in 30 days (will be given a free trial, but cannot afford meds), switch to warfarin as an outpatient.    2. syncope and subdural hematoma   Likely 2/2 flutter, patient was recommended for SAVANNA pt adamantly refuses it. Discharged home.

## 2023-11-23 NOTE — DISCHARGE NOTE PROVIDER - NSDCFUSCHEDAPPT_GEN_ALL_CORE_FT
Jay Vallejo  [facility] Physician [facility]  [facility] [address]  Scheduled Appointment: 11/30/2023

## 2023-11-23 NOTE — PROGRESS NOTE ADULT - PROBLEM SELECTOR PLAN 3
- F/up Fibrinogen, LDH, Haptoglobin   - Folate, B12, and Iron panel WNL; PBS with smudge cells c/f CLL   - Heme/Onc following, appreciate recs    - Fluids: not indicated  - Replete Lytes PRN to K>4, Mg>2   - Diet: DASH   - DVT ppx: None currently while awaiting clearance from Heme/Onc   - PT eval: N/A  - SW/Dispo: Medically active

## 2023-11-23 NOTE — CHART NOTE - NSCHARTNOTEFT_GEN_A_CORE
PA notified that blood culture from 11/21/23 positive for gram (+) cocci. Ordered Vancomycin 1000mg IV x1. Pt remains afebrile and hemodynamically stable.   To follow up sensitivity for further ABx guidance. PA notified that blood culture from 11/21/23 positive for gram (+) cocci. Ordered Vancomycin 1000mg IV x1 for broad spectrum coverage while culture sensitivity is pending; however, patient REFUSED treatment with ABx at this time and would like to think about the treatment before proceeding. Pt aware of risks of deferring treatment.   Pt well appearing, afebrile, and hemodynamically stable. No leukocytosis on AM labs.     Discuss with patient in AM and f/u blood culture sensitivity results.

## 2023-11-23 NOTE — PROGRESS NOTE ADULT - PROBLEM SELECTOR PLAN 2
- AC: Ok per NSGY standpoint; awaiting clearance from Heme/Onc given pancytopenia   - Continue Metoprolol tartrate 25mg TID, uptitrate as tolerated with HR goal <100   - TTE (11/22/23): EF 30-35% with mod-sev TR; BIAE  - EP consulted, appreciate recs

## 2023-11-23 NOTE — DISCHARGE NOTE PROVIDER - CARE PROVIDERS DIRECT ADDRESSES
,DirectAddress_Unknown,DirectAddress_Unknown ,DirectAddress_Unknown,DirectAddress_Unknown,[email or web address]

## 2023-11-23 NOTE — PROGRESS NOTE ADULT - SUBJECTIVE AND OBJECTIVE BOX
CARDIOLOGY PA PROGRESS NOTE    Interval Events:  - Blood Cx with Gm+ cocci with Staph epidermidis in 1 bottle     Overnight Events: JEWEL    Subjective:  - Pt seen and examined this AM sitting comfortably in bed. Feels well; no acute complaints of CP, palpitations, SOB, abdominal discomfort, N/V/D    ROS: Negative except per HPI and Subjective     Tele: Aflutter    MEDICATIONS:  metoprolol tartrate 25 milliGRAM(s) Oral every 8 hours    acetaminophen     Tablet .. 650 milliGRAM(s) Oral every 6 hours PRN    folic acid 1 milliGRAM(s) Oral daily  influenza  Vaccine (HIGH DOSE) 0.7 milliLiter(s) IntraMuscular once  magnesium sulfate  IVPB 2 Gram(s) IV Intermittent once  potassium chloride    Tablet ER 40 milliEquivalent(s) Oral once  thiamine 100 milliGRAM(s) Oral daily    	  VITAL SIGNS:  T(C): 36.8 (11-22-23 @ 09:36), Max: 36.8 (11-22-23 @ 09:36)  HR: 131 (11-22-23 @ 18:56) (98 - 146)  BP: 86/53 (11-22-23 @ 18:56) (86/53 - 115/63)  RR: 16 (11-22-23 @ 18:56) (16 - 18)  SpO2: 98% (11-22-23 @ 18:56) (97% - 99%)  Wt(kg): --    I&O's Summary    22 Nov 2023 07:01  -  23 Nov 2023 07:00  --------------------------------------------------------  IN: 800 mL / OUT: 175 mL / NET: 625 mL                 PHYSICAL EXAM:  HENT: NCAT, EOMI, PEERLA  CV: RRR, S1/S2. No JVD. 2/6 systolic murmur at apex; no rubs or gallops   PULM: CTA B/L. No wheezing, rales, or rhonchi   ABD: Soft, NT/ND, +BS throughout   EXT: Warm, no LE edema  NEURO: AOx3; no focal neuro deficits      LABS:	 	    - Hgb down-trending: 10.3 --> 8.7   - PLT improved: 50 --> 130   - Cr improved: 1.35 --> 1.03                          8.7    5.33  )-----------( 136      ( 23 Nov 2023 06:20 )             26.2     11-23    136  |  106  |  25<H>  ----------------------------<  130<H>  3.6   |  22  |  1.03    Ca    9.2      23 Nov 2023 06:20  Phos  2.3     11-21  Mg     1.5     11-23    TPro  6.8  /  Alb  3.4  /  TBili  0.7  /  DBili  0.2  /  AST  71<H>  /  ALT  25  /  AlkPhos  77  11-22    PT/INR - ( 21 Nov 2023 21:32 )   PT: 11.3 sec;   INR: 0.99          PTT - ( 21 Nov 2023 21:32 )  PTT:21.7 sec

## 2023-11-24 LAB
ANION GAP SERPL CALC-SCNC: 10 MMOL/L — SIGNIFICANT CHANGE UP (ref 5–17)
ANION GAP SERPL CALC-SCNC: 10 MMOL/L — SIGNIFICANT CHANGE UP (ref 5–17)
BASOPHILS # BLD AUTO: 0.03 K/UL — SIGNIFICANT CHANGE UP (ref 0–0.2)
BASOPHILS # BLD AUTO: 0.03 K/UL — SIGNIFICANT CHANGE UP (ref 0–0.2)
BASOPHILS NFR BLD AUTO: 0.5 % — SIGNIFICANT CHANGE UP (ref 0–2)
BASOPHILS NFR BLD AUTO: 0.5 % — SIGNIFICANT CHANGE UP (ref 0–2)
BLD GP AB SCN SERPL QL: NEGATIVE — SIGNIFICANT CHANGE UP
BLD GP AB SCN SERPL QL: NEGATIVE — SIGNIFICANT CHANGE UP
BUN SERPL-MCNC: 19 MG/DL — SIGNIFICANT CHANGE UP (ref 7–23)
BUN SERPL-MCNC: 19 MG/DL — SIGNIFICANT CHANGE UP (ref 7–23)
CALCIUM SERPL-MCNC: 9.6 MG/DL — SIGNIFICANT CHANGE UP (ref 8.4–10.5)
CALCIUM SERPL-MCNC: 9.6 MG/DL — SIGNIFICANT CHANGE UP (ref 8.4–10.5)
CHLORIDE SERPL-SCNC: 107 MMOL/L — SIGNIFICANT CHANGE UP (ref 96–108)
CHLORIDE SERPL-SCNC: 107 MMOL/L — SIGNIFICANT CHANGE UP (ref 96–108)
CO2 SERPL-SCNC: 22 MMOL/L — SIGNIFICANT CHANGE UP (ref 22–31)
CO2 SERPL-SCNC: 22 MMOL/L — SIGNIFICANT CHANGE UP (ref 22–31)
CREAT SERPL-MCNC: 0.9 MG/DL — SIGNIFICANT CHANGE UP (ref 0.5–1.3)
CREAT SERPL-MCNC: 0.9 MG/DL — SIGNIFICANT CHANGE UP (ref 0.5–1.3)
EGFR: 68 ML/MIN/1.73M2 — SIGNIFICANT CHANGE UP
EGFR: 68 ML/MIN/1.73M2 — SIGNIFICANT CHANGE UP
EOSINOPHIL # BLD AUTO: 0.18 K/UL — SIGNIFICANT CHANGE UP (ref 0–0.5)
EOSINOPHIL # BLD AUTO: 0.18 K/UL — SIGNIFICANT CHANGE UP (ref 0–0.5)
EOSINOPHIL NFR BLD AUTO: 3.2 % — SIGNIFICANT CHANGE UP (ref 0–6)
EOSINOPHIL NFR BLD AUTO: 3.2 % — SIGNIFICANT CHANGE UP (ref 0–6)
GLUCOSE SERPL-MCNC: 116 MG/DL — HIGH (ref 70–99)
GLUCOSE SERPL-MCNC: 116 MG/DL — HIGH (ref 70–99)
HCT VFR BLD CALC: 27.6 % — LOW (ref 34.5–45)
HCT VFR BLD CALC: 27.6 % — LOW (ref 34.5–45)
HGB BLD-MCNC: 9 G/DL — LOW (ref 11.5–15.5)
HGB BLD-MCNC: 9 G/DL — LOW (ref 11.5–15.5)
IMM GRANULOCYTES NFR BLD AUTO: 0.9 % — SIGNIFICANT CHANGE UP (ref 0–0.9)
IMM GRANULOCYTES NFR BLD AUTO: 0.9 % — SIGNIFICANT CHANGE UP (ref 0–0.9)
LYMPHOCYTES # BLD AUTO: 1.67 K/UL — SIGNIFICANT CHANGE UP (ref 1–3.3)
LYMPHOCYTES # BLD AUTO: 1.67 K/UL — SIGNIFICANT CHANGE UP (ref 1–3.3)
LYMPHOCYTES # BLD AUTO: 29.3 % — SIGNIFICANT CHANGE UP (ref 13–44)
LYMPHOCYTES # BLD AUTO: 29.3 % — SIGNIFICANT CHANGE UP (ref 13–44)
MAGNESIUM SERPL-MCNC: 1.5 MG/DL — LOW (ref 1.6–2.6)
MAGNESIUM SERPL-MCNC: 1.5 MG/DL — LOW (ref 1.6–2.6)
MCHC RBC-ENTMCNC: 32.6 GM/DL — SIGNIFICANT CHANGE UP (ref 32–36)
MCHC RBC-ENTMCNC: 32.6 GM/DL — SIGNIFICANT CHANGE UP (ref 32–36)
MCHC RBC-ENTMCNC: 37.3 PG — HIGH (ref 27–34)
MCHC RBC-ENTMCNC: 37.3 PG — HIGH (ref 27–34)
MCV RBC AUTO: 114.5 FL — HIGH (ref 80–100)
MCV RBC AUTO: 114.5 FL — HIGH (ref 80–100)
MONOCYTES # BLD AUTO: 0.63 K/UL — SIGNIFICANT CHANGE UP (ref 0–0.9)
MONOCYTES # BLD AUTO: 0.63 K/UL — SIGNIFICANT CHANGE UP (ref 0–0.9)
MONOCYTES NFR BLD AUTO: 11.1 % — SIGNIFICANT CHANGE UP (ref 2–14)
MONOCYTES NFR BLD AUTO: 11.1 % — SIGNIFICANT CHANGE UP (ref 2–14)
NEUTROPHILS # BLD AUTO: 3.13 K/UL — SIGNIFICANT CHANGE UP (ref 1.8–7.4)
NEUTROPHILS # BLD AUTO: 3.13 K/UL — SIGNIFICANT CHANGE UP (ref 1.8–7.4)
NEUTROPHILS NFR BLD AUTO: 55 % — SIGNIFICANT CHANGE UP (ref 43–77)
NEUTROPHILS NFR BLD AUTO: 55 % — SIGNIFICANT CHANGE UP (ref 43–77)
NRBC # BLD: 0 /100 WBCS — SIGNIFICANT CHANGE UP (ref 0–0)
NRBC # BLD: 0 /100 WBCS — SIGNIFICANT CHANGE UP (ref 0–0)
PLATELET # BLD AUTO: 168 K/UL — SIGNIFICANT CHANGE UP (ref 150–400)
PLATELET # BLD AUTO: 168 K/UL — SIGNIFICANT CHANGE UP (ref 150–400)
POTASSIUM SERPL-MCNC: 4 MMOL/L — SIGNIFICANT CHANGE UP (ref 3.5–5.3)
POTASSIUM SERPL-MCNC: 4 MMOL/L — SIGNIFICANT CHANGE UP (ref 3.5–5.3)
POTASSIUM SERPL-SCNC: 4 MMOL/L — SIGNIFICANT CHANGE UP (ref 3.5–5.3)
POTASSIUM SERPL-SCNC: 4 MMOL/L — SIGNIFICANT CHANGE UP (ref 3.5–5.3)
RBC # BLD: 2.41 M/UL — LOW (ref 3.8–5.2)
RBC # BLD: 2.41 M/UL — LOW (ref 3.8–5.2)
RBC # FLD: 14.5 % — SIGNIFICANT CHANGE UP (ref 10.3–14.5)
RBC # FLD: 14.5 % — SIGNIFICANT CHANGE UP (ref 10.3–14.5)
RH IG SCN BLD-IMP: POSITIVE — SIGNIFICANT CHANGE UP
RH IG SCN BLD-IMP: POSITIVE — SIGNIFICANT CHANGE UP
SODIUM SERPL-SCNC: 139 MMOL/L — SIGNIFICANT CHANGE UP (ref 135–145)
SODIUM SERPL-SCNC: 139 MMOL/L — SIGNIFICANT CHANGE UP (ref 135–145)
WBC # BLD: 5.69 K/UL — SIGNIFICANT CHANGE UP (ref 3.8–10.5)
WBC # BLD: 5.69 K/UL — SIGNIFICANT CHANGE UP (ref 3.8–10.5)
WBC # FLD AUTO: 5.69 K/UL — SIGNIFICANT CHANGE UP (ref 3.8–10.5)
WBC # FLD AUTO: 5.69 K/UL — SIGNIFICANT CHANGE UP (ref 3.8–10.5)

## 2023-11-24 PROCEDURE — 93653 COMPRE EP EVAL TX SVT: CPT

## 2023-11-24 PROCEDURE — 93312 ECHO TRANSESOPHAGEAL: CPT | Mod: 26

## 2023-11-24 PROCEDURE — 99232 SBSQ HOSP IP/OBS MODERATE 35: CPT | Mod: 25

## 2023-11-24 RX ORDER — MAGNESIUM OXIDE 400 MG ORAL TABLET 241.3 MG
800 TABLET ORAL ONCE
Refills: 0 | Status: DISCONTINUED | OUTPATIENT
Start: 2023-11-24 | End: 2023-11-24

## 2023-11-24 RX ORDER — ONDANSETRON 8 MG/1
4 TABLET, FILM COATED ORAL ONCE
Refills: 0 | Status: COMPLETED | OUTPATIENT
Start: 2023-11-24 | End: 2023-11-24

## 2023-11-24 RX ORDER — DIPHENHYDRAMINE HCL 50 MG
25 CAPSULE ORAL ONCE
Refills: 0 | Status: COMPLETED | OUTPATIENT
Start: 2023-11-24 | End: 2023-11-24

## 2023-11-24 RX ORDER — MAGNESIUM OXIDE 400 MG ORAL TABLET 241.3 MG
800 TABLET ORAL ONCE
Refills: 0 | Status: COMPLETED | OUTPATIENT
Start: 2023-11-24 | End: 2023-11-24

## 2023-11-24 RX ORDER — LANOLIN ALCOHOL/MO/W.PET/CERES
5 CREAM (GRAM) TOPICAL AT BEDTIME
Refills: 0 | Status: DISCONTINUED | OUTPATIENT
Start: 2023-11-24 | End: 2023-11-28

## 2023-11-24 RX ORDER — METOCLOPRAMIDE HCL 10 MG
10 TABLET ORAL ONCE
Refills: 0 | Status: COMPLETED | OUTPATIENT
Start: 2023-11-24 | End: 2023-11-24

## 2023-11-24 RX ADMIN — APIXABAN 5 MILLIGRAM(S): 2.5 TABLET, FILM COATED ORAL at 05:13

## 2023-11-24 RX ADMIN — Medication 100 MILLIGRAM(S): at 13:16

## 2023-11-24 RX ADMIN — MAGNESIUM OXIDE 400 MG ORAL TABLET 800 MILLIGRAM(S): 241.3 TABLET ORAL at 10:14

## 2023-11-24 RX ADMIN — Medication 10 MILLIGRAM(S): at 23:16

## 2023-11-24 RX ADMIN — Medication 50 MILLIGRAM(S): at 04:15

## 2023-11-24 RX ADMIN — Medication 1 MILLIGRAM(S): at 13:16

## 2023-11-24 RX ADMIN — ONDANSETRON 4 MILLIGRAM(S): 8 TABLET, FILM COATED ORAL at 20:15

## 2023-11-24 RX ADMIN — ONDANSETRON 4 MILLIGRAM(S): 8 TABLET, FILM COATED ORAL at 21:33

## 2023-11-24 RX ADMIN — Medication 25 MILLIGRAM(S): at 04:15

## 2023-11-24 RX ADMIN — Medication 50 MILLIGRAM(S): at 13:16

## 2023-11-24 NOTE — PROGRESS NOTE ADULT - SUBJECTIVE AND OBJECTIVE BOX
Problem: Knowledge Deficit  Goal: Patient/family/caregiver demonstrates understanding of disease process, treatment plan, medications, and discharge instructions  Description: Complete learning assessment and assess knowledge base.   Interventions:  - Provide teaching at level of understanding  - Provide teaching via preferred learning methods  Outcome: Progressing EPS Progress Note    S: in bed, NAD     MEDICATIONS  (STANDING):  apixaban 5 milliGRAM(s) Oral two times a day  folic acid 1 milliGRAM(s) Oral daily  influenza  Vaccine (HIGH DOSE) 0.7 milliLiter(s) IntraMuscular once  metoprolol tartrate 50 milliGRAM(s) Oral every 8 hours  thiamine 100 milliGRAM(s) Oral daily      Telemetry:  a.flutter            General:  NAD        HEENT:    PERRL, EOMI	  Neck: Supple, - JVD  Cardiovascular:  S1 S2, irregular,  No JVD  Respiratory: CTA B/L     	  Gastrointestinal:  Soft, Non-tender, + BS	  Extremities: No edema  Psychiatry: A & O x 3         Labs:                                                               9.0    5.69  )-----------( 168      ( 24 Nov 2023 05:30 )             27.6     11-24    139  |  107  |  19  ----------------------------<  116<H>  4.0   |  22  |  0.90    Ca    9.6      24 Nov 2023 05:30  Mg     1.5     11-24        Assessment/Plan:  74 y/o  female, retired [employer]  , who lives alone, denies any past medical history, (currently not taking medication) poor medical follow up  (has not seen a physician in years) presents to [facility] ER evening of 11/21/23, with syncope w/ head trauma, with prodrome symptoms of fatigue and lightheadedness, found to be in atrial flutter, course complicated by subacute/chronic subdural hematoma and pancytopenia.  Patient was cleared for a/c, she is now on Eliquis plan for ANGELA and ablation of atrial flutter today. Patient agrees, consent for procedure signed.

## 2023-11-24 NOTE — PROGRESS NOTE ADULT - PROBLEM SELECTOR PLAN 3
- Cleared for AC per Heme/Onc when PLT >50  - Folate, B12, and Iron panel WNL; Elevated  though normal Hapto less likely for hemolytic source; PBS with smudge cells   - Heme/Onc following, appreciate recs    - Fluids: not indicated  - Replete Lytes PRN to K>4, Mg>2   - Diet: DASH   - DVT ppx: Eliquis   - PT eval: SAVANNA  - SW/Dispo: Medically active

## 2023-11-24 NOTE — PROGRESS NOTE ADULT - ASSESSMENT
73F poor medical follow-up (has not seen physician in 15-20 yrs.) presenting with recurrent syncope with +head strike with associated prodrome sxs of fatigue and lightheadedness. Course c/b stable 4mm SDH, new-onsent Aflutter RVR, and thrombocytopenia.

## 2023-11-24 NOTE — PROGRESS NOTE ADULT - PROBLEM SELECTOR PLAN 2
- AC: Eliquis 5mg BID   - Continue Metoprolol tartrate 50mg TID, uptitrate as tolerated with HR goal <100   - TTE (11/22/23): EF 30-35% with mod-sev TR; BIAE  - Plan for ANGELA with ablation 11/24/23  - EP consulted, appreciate recs

## 2023-11-24 NOTE — PROGRESS NOTE ADULT - PROBLEM SELECTOR PLAN 1
- Hx of recurrent syncope (1-2x every 2 weeks). Lives alone so episodes are unwitnessed   - HEAD CT (11/21/23): Small 4mm hypodense collection over left parietal convexity suggestive of a subacute/chronic subdural hematoma.  No acute ICH/calvarial fx. Repeat CT Head unchanged   - Cleared for AC per NSGY standpoint   - Neuro following, appreciate recs

## 2023-11-24 NOTE — PRE-ANESTHESIA EVALUATION ADULT - MALLAMPATI CLASS
Class II - visualization of the soft palate, fauces, and uvula limited mouth opening/Class III - visualization of the soft palate and the base of the uvula

## 2023-11-25 DIAGNOSIS — I50.20 UNSPECIFIED SYSTOLIC (CONGESTIVE) HEART FAILURE: ICD-10-CM

## 2023-11-25 LAB
-  CLINDAMYCIN: SIGNIFICANT CHANGE UP
-  CLINDAMYCIN: SIGNIFICANT CHANGE UP
-  ERYTHROMYCIN: SIGNIFICANT CHANGE UP
-  ERYTHROMYCIN: SIGNIFICANT CHANGE UP
-  LINEZOLID: SIGNIFICANT CHANGE UP
-  LINEZOLID: SIGNIFICANT CHANGE UP
-  OXACILLIN: SIGNIFICANT CHANGE UP
-  OXACILLIN: SIGNIFICANT CHANGE UP
-  RIFAMPIN: SIGNIFICANT CHANGE UP
-  RIFAMPIN: SIGNIFICANT CHANGE UP
-  TRIMETHOPRIM/SULFAMETHOXAZOLE: SIGNIFICANT CHANGE UP
-  TRIMETHOPRIM/SULFAMETHOXAZOLE: SIGNIFICANT CHANGE UP
-  VANCOMYCIN: SIGNIFICANT CHANGE UP
-  VANCOMYCIN: SIGNIFICANT CHANGE UP
ANION GAP SERPL CALC-SCNC: 10 MMOL/L — SIGNIFICANT CHANGE UP (ref 5–17)
ANION GAP SERPL CALC-SCNC: 10 MMOL/L — SIGNIFICANT CHANGE UP (ref 5–17)
BUN SERPL-MCNC: 14 MG/DL — SIGNIFICANT CHANGE UP (ref 7–23)
BUN SERPL-MCNC: 14 MG/DL — SIGNIFICANT CHANGE UP (ref 7–23)
CALCIUM SERPL-MCNC: 9.2 MG/DL — SIGNIFICANT CHANGE UP (ref 8.4–10.5)
CALCIUM SERPL-MCNC: 9.2 MG/DL — SIGNIFICANT CHANGE UP (ref 8.4–10.5)
CHLORIDE SERPL-SCNC: 104 MMOL/L — SIGNIFICANT CHANGE UP (ref 96–108)
CHLORIDE SERPL-SCNC: 104 MMOL/L — SIGNIFICANT CHANGE UP (ref 96–108)
CO2 SERPL-SCNC: 22 MMOL/L — SIGNIFICANT CHANGE UP (ref 22–31)
CO2 SERPL-SCNC: 22 MMOL/L — SIGNIFICANT CHANGE UP (ref 22–31)
CREAT SERPL-MCNC: 0.83 MG/DL — SIGNIFICANT CHANGE UP (ref 0.5–1.3)
CREAT SERPL-MCNC: 0.83 MG/DL — SIGNIFICANT CHANGE UP (ref 0.5–1.3)
CULTURE RESULTS: ABNORMAL
CULTURE RESULTS: ABNORMAL
EGFR: 74 ML/MIN/1.73M2 — SIGNIFICANT CHANGE UP
EGFR: 74 ML/MIN/1.73M2 — SIGNIFICANT CHANGE UP
GLUCOSE SERPL-MCNC: 162 MG/DL — HIGH (ref 70–99)
GLUCOSE SERPL-MCNC: 162 MG/DL — HIGH (ref 70–99)
HCT VFR BLD CALC: 25.2 % — LOW (ref 34.5–45)
HCT VFR BLD CALC: 25.2 % — LOW (ref 34.5–45)
HGB BLD-MCNC: 8.3 G/DL — LOW (ref 11.5–15.5)
HGB BLD-MCNC: 8.3 G/DL — LOW (ref 11.5–15.5)
MAGNESIUM SERPL-MCNC: 1.6 MG/DL — SIGNIFICANT CHANGE UP (ref 1.6–2.6)
MAGNESIUM SERPL-MCNC: 1.6 MG/DL — SIGNIFICANT CHANGE UP (ref 1.6–2.6)
MCHC RBC-ENTMCNC: 32.9 GM/DL — SIGNIFICANT CHANGE UP (ref 32–36)
MCHC RBC-ENTMCNC: 32.9 GM/DL — SIGNIFICANT CHANGE UP (ref 32–36)
MCHC RBC-ENTMCNC: 37.4 PG — HIGH (ref 27–34)
MCHC RBC-ENTMCNC: 37.4 PG — HIGH (ref 27–34)
MCV RBC AUTO: 113.5 FL — HIGH (ref 80–100)
MCV RBC AUTO: 113.5 FL — HIGH (ref 80–100)
METHOD TYPE: SIGNIFICANT CHANGE UP
METHOD TYPE: SIGNIFICANT CHANGE UP
NRBC # BLD: 0 /100 WBCS — SIGNIFICANT CHANGE UP (ref 0–0)
NRBC # BLD: 0 /100 WBCS — SIGNIFICANT CHANGE UP (ref 0–0)
ORGANISM # SPEC MICROSCOPIC CNT: ABNORMAL
ORGANISM # SPEC MICROSCOPIC CNT: SIGNIFICANT CHANGE UP
ORGANISM # SPEC MICROSCOPIC CNT: SIGNIFICANT CHANGE UP
PLATELET # BLD AUTO: 199 K/UL — SIGNIFICANT CHANGE UP (ref 150–400)
PLATELET # BLD AUTO: 199 K/UL — SIGNIFICANT CHANGE UP (ref 150–400)
POTASSIUM SERPL-MCNC: 4.3 MMOL/L — SIGNIFICANT CHANGE UP (ref 3.5–5.3)
POTASSIUM SERPL-MCNC: 4.3 MMOL/L — SIGNIFICANT CHANGE UP (ref 3.5–5.3)
POTASSIUM SERPL-SCNC: 4.3 MMOL/L — SIGNIFICANT CHANGE UP (ref 3.5–5.3)
POTASSIUM SERPL-SCNC: 4.3 MMOL/L — SIGNIFICANT CHANGE UP (ref 3.5–5.3)
RBC # BLD: 2.22 M/UL — LOW (ref 3.8–5.2)
RBC # BLD: 2.22 M/UL — LOW (ref 3.8–5.2)
RBC # FLD: 14.5 % — SIGNIFICANT CHANGE UP (ref 10.3–14.5)
RBC # FLD: 14.5 % — SIGNIFICANT CHANGE UP (ref 10.3–14.5)
SODIUM SERPL-SCNC: 136 MMOL/L — SIGNIFICANT CHANGE UP (ref 135–145)
SODIUM SERPL-SCNC: 136 MMOL/L — SIGNIFICANT CHANGE UP (ref 135–145)
SPECIMEN SOURCE: SIGNIFICANT CHANGE UP
SPECIMEN SOURCE: SIGNIFICANT CHANGE UP
WBC # BLD: 6.49 K/UL — SIGNIFICANT CHANGE UP (ref 3.8–10.5)
WBC # BLD: 6.49 K/UL — SIGNIFICANT CHANGE UP (ref 3.8–10.5)
WBC # FLD AUTO: 6.49 K/UL — SIGNIFICANT CHANGE UP (ref 3.8–10.5)
WBC # FLD AUTO: 6.49 K/UL — SIGNIFICANT CHANGE UP (ref 3.8–10.5)

## 2023-11-25 PROCEDURE — 99233 SBSQ HOSP IP/OBS HIGH 50: CPT

## 2023-11-25 PROCEDURE — 99232 SBSQ HOSP IP/OBS MODERATE 35: CPT

## 2023-11-25 RX ORDER — LOSARTAN POTASSIUM 100 MG/1
25 TABLET, FILM COATED ORAL DAILY
Refills: 0 | Status: DISCONTINUED | OUTPATIENT
Start: 2023-11-25 | End: 2023-11-28

## 2023-11-25 RX ORDER — MAGNESIUM OXIDE 400 MG ORAL TABLET 241.3 MG
800 TABLET ORAL ONCE
Refills: 0 | Status: COMPLETED | OUTPATIENT
Start: 2023-11-25 | End: 2023-11-25

## 2023-11-25 RX ORDER — METOPROLOL TARTRATE 50 MG
50 TABLET ORAL DAILY
Refills: 0 | Status: DISCONTINUED | OUTPATIENT
Start: 2023-11-26 | End: 2023-11-28

## 2023-11-25 RX ORDER — DIPHENHYDRAMINE HCL 50 MG
25 CAPSULE ORAL ONCE
Refills: 0 | Status: COMPLETED | OUTPATIENT
Start: 2023-11-25 | End: 2023-11-25

## 2023-11-25 RX ADMIN — Medication 1 MILLIGRAM(S): at 13:17

## 2023-11-25 RX ADMIN — Medication 50 MILLIGRAM(S): at 00:47

## 2023-11-25 RX ADMIN — Medication 25 MILLIGRAM(S): at 22:13

## 2023-11-25 RX ADMIN — APIXABAN 5 MILLIGRAM(S): 2.5 TABLET, FILM COATED ORAL at 00:47

## 2023-11-25 RX ADMIN — Medication 50 MILLIGRAM(S): at 09:19

## 2023-11-25 RX ADMIN — APIXABAN 5 MILLIGRAM(S): 2.5 TABLET, FILM COATED ORAL at 22:13

## 2023-11-25 RX ADMIN — Medication 100 MILLIGRAM(S): at 13:17

## 2023-11-25 RX ADMIN — APIXABAN 5 MILLIGRAM(S): 2.5 TABLET, FILM COATED ORAL at 13:17

## 2023-11-25 RX ADMIN — Medication 25 MILLIGRAM(S): at 00:46

## 2023-11-25 RX ADMIN — MAGNESIUM OXIDE 400 MG ORAL TABLET 800 MILLIGRAM(S): 241.3 TABLET ORAL at 09:13

## 2023-11-25 RX ADMIN — LOSARTAN POTASSIUM 25 MILLIGRAM(S): 100 TABLET, FILM COATED ORAL at 16:50

## 2023-11-25 NOTE — PROGRESS NOTE ADULT - PROBLEM SELECTOR PLAN 3
- Cleared for AC per Heme/Onc when PLT >50  - Folate, B12, and Iron panel WNL; Elevated  though normal Hapto less likely for hemolytic source; PBS with smudge cells   - Heme/Onc following, appreciate recs    - Fluids: not indicated  - Replete Lytes PRN to K>4, Mg>2   - Diet: DASH   - DVT ppx: Eliquis   - PT eval: SAVANNA  - SW/Dispo: Medically active - GDMT: Metoprolol XL 50mg QD and Losartan 25mg QD    o Consider Spironolactone 12.5mg QD + Farxiga 10mg QD  - TTE (11/22/23): EF 30-35% with mod-sev TR; BIAE

## 2023-11-25 NOTE — PROGRESS NOTE ADULT - SUBJECTIVE AND OBJECTIVE BOX
CARDIOLOGY PA PROGRESS NOTE    Interval Events:  - s/p ANGELA and ablation; now NSR with frequent PACs     Overnight Events: JEWEL    Subjective:   - Pt seen and examined this AM sitting comfortably in bed. Feels well; no acute complaints of CP, palpitations, SOB, abdominal discomfort, N/V/D    ROS: Negative except per HPI and Subjective     Tele: NSR with frequent PACs    MEDICATIONS:  losartan 25 milliGRAM(s) Oral daily  acetaminophen     Tablet .. 650 milliGRAM(s) Oral every 6 hours PRN  melatonin 5 milliGRAM(s) Oral at bedtime PRN  apixaban 5 milliGRAM(s) Oral two times a day  folic acid 1 milliGRAM(s) Oral daily  influenza  Vaccine (HIGH DOSE) 0.7 milliLiter(s) IntraMuscular once  thiamine 100 milliGRAM(s) Oral daily    	  VITAL SIGNS:  T(C): 36.7 (11-25-23 @ 06:06), Max: 36.8 (11-24-23 @ 17:18)  HR: 56 (11-25-23 @ 06:06) (56 - 102)  BP: 123/71 (11-25-23 @ 06:06) (104/60 - 134/77)  RR: 18 (11-25-23 @ 06:06) (16 - 18)  SpO2: 100% (11-25-23 @ 06:06) (94% - 100%)  Wt(kg): --    I&O's Summary    24 Nov 2023 07:01  -  25 Nov 2023 07:00  --------------------------------------------------------  IN: 100 mL / OUT: 0 mL / NET: 100 mL    25 Nov 2023 07:01  -  25 Nov 2023 09:47  --------------------------------------------------------  IN: 100 mL / OUT: 0 mL / NET: 100 mL      Height (cm): 166.4 (11-24 @ 12:59)  Weight (kg): 68 (11-24 @ 12:59)  BMI (kg/m2): 24.6 (11-24 @ 12:59)  BSA (m2): 1.76 (11-24 @ 12:59)                                     PHYSICAL EXAM:  HENT: NCAT, EOMI, PEERLA  CV: RRR, S1/S2. No JVD. No murmurs, rubs or gallops   PULM: CTA B/L. No wheezing, rales, or rhonchi   ABD: Soft, NT/ND, +BS throughout   EXT: Warm, no LE edema. Rt groin dressing C/D/I -- no oozing, drainage, or bleeding noted  VASC: +2 DP/PT b/l   NEURO: AOx3; no focal neuro deficits      LABS:	 	               8.3    6.49  )-----------( 199      ( 25 Nov 2023 06:33 )             25.2     11-25    136  |  104  |  14  ----------------------------<  162<H>  4.3   |  22  |  0.83    Ca    9.2      25 Nov 2023 06:33  Mg     1.6     11-25

## 2023-11-25 NOTE — PROGRESS NOTE ADULT - PROBLEM SELECTOR PLAN 2
- AC: Eliquis 5mg BID   - GDMT: Metoprolol XL 50mg QD and Losartan 25mg QD    o Consider Spironolactone 12.5mg QD + Farxiga 10mg QD  - TTE (11/22/23): EF 30-35% with mod-sev TR; BIAE  - S/p ANGELA & Ablation (11/24/23); now currently NSR with frequent PACs  - EP consulted, appreciate recs - AC: Eliquis 5mg BID   - Continue Metoprolol XL 50mg QD  - S/p ANGELA & Ablation (11/24/23); now currently NSR with frequent PACs  - EP consulted, appreciate recs

## 2023-11-26 PROCEDURE — 99232 SBSQ HOSP IP/OBS MODERATE 35: CPT

## 2023-11-26 RX ORDER — CALAMINE AND ZINC OXIDE AND PHENOL 160; 10 MG/ML; MG/ML
1 LOTION TOPICAL
Refills: 0 | Status: DISCONTINUED | OUTPATIENT
Start: 2023-11-26 | End: 2023-11-28

## 2023-11-26 RX ORDER — APIXABAN 2.5 MG/1
1 TABLET, FILM COATED ORAL
Qty: 60 | Refills: 0
Start: 2023-11-26 | End: 2023-12-25

## 2023-11-26 RX ORDER — POLYETHYLENE GLYCOL 3350 17 G/17G
17 POWDER, FOR SOLUTION ORAL DAILY
Refills: 0 | Status: DISCONTINUED | OUTPATIENT
Start: 2023-11-26 | End: 2023-11-28

## 2023-11-26 RX ORDER — SPIRONOLACTONE 25 MG/1
12.5 TABLET, FILM COATED ORAL DAILY
Refills: 0 | Status: DISCONTINUED | OUTPATIENT
Start: 2023-11-26 | End: 2023-11-28

## 2023-11-26 RX ADMIN — LOSARTAN POTASSIUM 25 MILLIGRAM(S): 100 TABLET, FILM COATED ORAL at 05:52

## 2023-11-26 RX ADMIN — SPIRONOLACTONE 12.5 MILLIGRAM(S): 25 TABLET, FILM COATED ORAL at 14:13

## 2023-11-26 RX ADMIN — APIXABAN 5 MILLIGRAM(S): 2.5 TABLET, FILM COATED ORAL at 22:55

## 2023-11-26 RX ADMIN — Medication 100 MILLIGRAM(S): at 11:15

## 2023-11-26 RX ADMIN — Medication 1 MILLIGRAM(S): at 11:15

## 2023-11-26 RX ADMIN — CALAMINE AND ZINC OXIDE AND PHENOL 1 APPLICATION(S): 160; 10 LOTION TOPICAL at 19:17

## 2023-11-26 RX ADMIN — APIXABAN 5 MILLIGRAM(S): 2.5 TABLET, FILM COATED ORAL at 11:15

## 2023-11-26 RX ADMIN — POLYETHYLENE GLYCOL 3350 17 GRAM(S): 17 POWDER, FOR SOLUTION ORAL at 16:47

## 2023-11-26 RX ADMIN — Medication 50 MILLIGRAM(S): at 05:52

## 2023-11-26 NOTE — PROGRESS NOTE ADULT - PROBLEM SELECTOR PLAN 2
- AC: Eliquis 5mg BID   - Continue Metoprolol XL 50mg QD  - S/p ANGELA & Ablation (11/24/23); now currently NSR with frequent PACs  - EP consulted, appreciate recs

## 2023-11-26 NOTE — PROGRESS NOTE ADULT - SUBJECTIVE AND OBJECTIVE BOX
Interventional Cardiology PA Adult Progress Note    Subjective Assessment:  	  MEDICATIONS:  losartan 25 milliGRAM(s) Oral daily  metoprolol succinate ER 50 milliGRAM(s) Oral daily        acetaminophen     Tablet .. 650 milliGRAM(s) Oral every 6 hours PRN  melatonin 5 milliGRAM(s) Oral at bedtime PRN        apixaban 5 milliGRAM(s) Oral two times a day  folic acid 1 milliGRAM(s) Oral daily  influenza  Vaccine (HIGH DOSE) 0.7 milliLiter(s) IntraMuscular once  thiamine 100 milliGRAM(s) Oral daily      	    [PHYSICAL EXAM:  TELEMETRY:  T(C): 36.9 (11-26-23 @ 05:51), Max: 37.3 (11-25-23 @ 20:31)  HR: 70 (11-26-23 @ 05:51) (63 - 79)  BP: 122/73 (11-26-23 @ 05:51) (97/64 - 134/80)  RR: 17 (11-26-23 @ 05:51) (16 - 17)  SpO2: 98% (11-26-23 @ 05:51) (98% - 99%)  Wt(kg): --  I&O's Summary    25 Nov 2023 07:01  -  26 Nov 2023 07:00  --------------------------------------------------------  IN: 580 mL / OUT: 350 mL / NET: 230 mL        Morfin:  Central/PICC/Mid Line:                                               	    ECG:  	  RADIOLOGY:   DIAGNOSTIC TESTING:  [ ] Echocardiogram:  [ ]  Catheterization:  [ ] Stress Test:    [ ] ANGELA  OTHER: 	    LABS:	 	  CARDIAC MARKERS:                                  8.3    6.49  )-----------( 199      ( 25 Nov 2023 06:33 )             25.2     11-25    136  |  104  |  14  ----------------------------<  162<H>  4.3   |  22  |  0.83    Ca    9.2      25 Nov 2023 06:33  Mg     1.6     11-25      proBNP:   Lipid Profile:   HgA1c:   TSH:       ASSESSMENT/PLAN: 	        DVT ppx:  Dispo:     Interventional Cardiology PA Adult Progress Note    Subjective Assessment: Pt. seen and examined at bedside today am, Pt. comfortable, denies any CP, SOB, dizziness, palpitation, N/V. c/o of neighbor keeping her up all night,     ROS neg except as per subjective HPI.   	  MEDICATIONS:  losartan 25 milliGRAM(s) Oral daily  metoprolol succinate ER 50 milliGRAM(s) Oral daily  acetaminophen     Tablet .. 650 milliGRAM(s) Oral every 6 hours PRN  melatonin 5 milliGRAM(s) Oral at bedtime PRN  apixaban 5 milliGRAM(s) Oral two times a day  folic acid 1 milliGRAM(s) Oral daily  influenza  Vaccine (HIGH DOSE) 0.7 milliLiter(s) IntraMuscular once  thiamine 100 milliGRAM(s) Oral daily      	    [PHYSICAL EXAM:  TELEMETRY:  T(C): 36.9 (11-26-23 @ 05:51), Max: 37.3 (11-25-23 @ 20:31)  HR: 70 (11-26-23 @ 05:51) (63 - 79)  BP: 122/73 (11-26-23 @ 05:51) (97/64 - 134/80)  RR: 17 (11-26-23 @ 05:51) (16 - 17)  SpO2: 98% (11-26-23 @ 05:51) (98% - 99%)  Wt(kg): --  I&O's Summary    25 Nov 2023 07:01  -  26 Nov 2023 07:00  --------------------------------------------------------  IN: 580 mL / OUT: 350 mL / NET: 230 mL        Morfin:  Central/PICC/Mid Line:                                           HENT: NCAT, EOMI, PEERLA  CV: RRR, S1/S2. No JVD. No murmurs, rubs or gallops   PULM: CTA B/L. No wheezing, rales, or rhonchi   ABD: Soft, NT/ND, +BS throughout   EXT: Warm, no LE edema.   NEURO: AOx3; no focal neuro deficits    	    ECG:  	  RADIOLOGY:   DIAGNOSTIC TESTING:  [ ] Echocardiogram:  [ ]  Catheterization:  [ ] Stress Test:    [ ] ANGELA  OTHER: 	    LABS:	 	  CARDIAC MARKERS:                                  8.3    6.49  )-----------( 199      ( 25 Nov 2023 06:33 )             25.2     11-25    136  |  104  |  14  ----------------------------<  162<H>  4.3   |  22  |  0.83    Ca    9.2      25 Nov 2023 06:33  Mg     1.6     11-25      proBNP:   Lipid Profile:   HgA1c:   TSH:       ASSESSMENT/PLAN: 	        DVT ppx:  Dispo:

## 2023-11-26 NOTE — PROGRESS NOTE ADULT - PROBLEM SELECTOR PLAN 4
- Cleared for AC per Heme/Onc when PLT >50  - Folate, B12, and Iron panel WNL; Elevated  though normal Hapto less likely for hemolytic source; PBS with smudge cells   - Heme/Onc following, appreciate recs    - Fluids: not indicated  - Replete Lytes PRN to K>4, Mg>2   - Diet: DASH   - DVT ppx: Eliquis   - PT eval: SAVANNA  - SW/Dispo: Medically active - Cleared for AC per Heme/Onc when PLT >50  - Folate, B12, and Iron panel WNL; Elevated  though normal Hapto less likely for hemolytic source; PBS with smudge cells   - Heme/Onc following, appreciate recs    - Fluids: not indicated  - Replete Lytes PRN to K>4, Mg>2   - Diet: DASH   - DVT ppx: Eliquis   - PT eval: SAVANNA - Cleared for AC per Heme/Onc when PLT >50, Plt stable at 162 now  - Folate, B12, and Iron panel WNL; Elevated  though normal Hapto less likely for hemolytic source; PBS with smudge cells   - Heme/Onc following, appreciate recs    - Fluids: not indicated  - Replete Lytes PRN to K>4, Mg>2   - Diet: DASH   - DVT ppx: Eliquis   - PT eval: SAVANNA

## 2023-11-26 NOTE — PROGRESS NOTE ADULT - PROBLEM SELECTOR PLAN 1
- Hx of recurrent syncope (1-2x every 2 weeks). Lives alone so episodes are unwitnessed   - HEAD CT (11/21/23): Small 4mm hypodense collection over left parietal convexity suggestive of a subacute/chronic subdural hematoma.  No acute ICH/calvarial fx. Repeat CT Head unchanged   - Cleared for AC per NSGY standpoint  with plan for  re-scan with CT head for any change in the patients neurological status.  - f/u o/p with neuro Dr. D'Amico.

## 2023-11-26 NOTE — PROGRESS NOTE ADULT - PROBLEM SELECTOR PLAN 3
- GDMT: Metoprolol XL 50mg QD and Losartan 25mg QD    - Consider Spironolactone 12.5mg QD + Farxiga 10mg QD  - TTE (11/22/23): EF 30-35% with mod-sev TR; BIAE

## 2023-11-26 NOTE — PROGRESS NOTE ADULT - ASSESSMENT
74 y/o F poor medical follow-up (has not seen physician in 15-20 yrs.) presenting with recurrent syncope with +head strike with associated prodrome sxs of fatigue and lightheadedness. Course c/b stable 4mm SDH, new-onsent Aflutter RVR, and thrombocytopenia. Now s/p Aflutter ablation and currently in NSR.

## 2023-11-26 NOTE — PROGRESS NOTE ADULT - NSPROGADDITIONALINFOA_GEN_ALL_CORE
Attending Attestation:  I was physically present for the key portions of the evaluation and management (E/M) service provided.  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 Keep NPO at midnight for possible ANGELA/CVN/ablation tmw    Jay Vallejo MD  Cardiology
Attending Attestation:  I was physically present for the key portions of the evaluation and management (E/M) service provided.  I agree with the above history, physical, and plan which I have reviewed.  - ANGELA-ablation today with EP  Jay aVllejo MD (Cardiology)
Attending Attestation:  I was physically present for the key portions of the evaluation and management (E/M) service provided.  I agree with the above history, physical, and plan which I have reviewed with the following edits/addendum:    73F w no known medical hx (no medical contact in last decade) p/w recurrent pre/syncope w head injury found to be in Atrial flutter now s/p ANGELA ablation 11/25 by Dr Payan. Head imaging w stable 4mm SDH, cleared for AC by neurosurgery. Also cleared by hemonc who evaluated pt for thrombocytopenia. TTE 11/22 with LVEF 30-35%.    #HFrEF - suspect tachy-mediated CMP. Optimize GDMT w metop succ 100 mg daily, start losartan 25 mg. plan to repeat TTE after 3 months of good med rx  #AFL s/p ablation - continue AC indefinitely as long as platelet cts remain >50 k per hemonc. repeat CBC on FU.    Jay Vallejo MD  Cardiology
Attending Attestation:  I was physically present for the key portions of the evaluation and management (E/M) service provided.  I agree with the above history, physical, and plan which I have reviewed with the following edits/addendum:    73F w no known medical hx (no medical contact in last decade) p/w recurrent pre/syncope w head injury found to be in Atrial flutter now s/p ANGELA ablation 11/25 by Dr Payan. Head imaging w stable 4mm SDH, cleared for AC by neurosurgery. Also cleared by hemonc who evaluated pt for thrombocytopenia. TTE 11/22 with LVEF 30-35%.    #HFrEF - suspect tachy-mediated CMP. Optimizing GDMT w metop succ 100 mg daily, losartan 25 mg, start MRA. plan to repeat TTE after 3 months of good med rx  #AFL s/p ablation - continue AC indefinitely as long as platelet cts remain >50 k per hemonc. repeat CBC on FU.    Jay Vallejo MD  Cardiology
Attending Attestation:  I was physically present for the key portions of the evaluation and management (E/M) service provided.  I agree with the above history, physical, and plan which I have reviewed.  Jay Vallejo MD (Cardiology)  See HnP attestation. Initial encounter 11/22

## 2023-11-27 LAB
ANION GAP SERPL CALC-SCNC: 11 MMOL/L — SIGNIFICANT CHANGE UP (ref 5–17)
ANION GAP SERPL CALC-SCNC: 11 MMOL/L — SIGNIFICANT CHANGE UP (ref 5–17)
BUN SERPL-MCNC: 11 MG/DL — SIGNIFICANT CHANGE UP (ref 7–23)
BUN SERPL-MCNC: 11 MG/DL — SIGNIFICANT CHANGE UP (ref 7–23)
CALCIUM SERPL-MCNC: 10.1 MG/DL — SIGNIFICANT CHANGE UP (ref 8.4–10.5)
CALCIUM SERPL-MCNC: 10.1 MG/DL — SIGNIFICANT CHANGE UP (ref 8.4–10.5)
CHLORIDE SERPL-SCNC: 103 MMOL/L — SIGNIFICANT CHANGE UP (ref 96–108)
CHLORIDE SERPL-SCNC: 103 MMOL/L — SIGNIFICANT CHANGE UP (ref 96–108)
CO2 SERPL-SCNC: 27 MMOL/L — SIGNIFICANT CHANGE UP (ref 22–31)
CO2 SERPL-SCNC: 27 MMOL/L — SIGNIFICANT CHANGE UP (ref 22–31)
CREAT SERPL-MCNC: 0.77 MG/DL — SIGNIFICANT CHANGE UP (ref 0.5–1.3)
CREAT SERPL-MCNC: 0.77 MG/DL — SIGNIFICANT CHANGE UP (ref 0.5–1.3)
CULTURE RESULTS: SIGNIFICANT CHANGE UP
CULTURE RESULTS: SIGNIFICANT CHANGE UP
EGFR: 81 ML/MIN/1.73M2 — SIGNIFICANT CHANGE UP
EGFR: 81 ML/MIN/1.73M2 — SIGNIFICANT CHANGE UP
GLUCOSE SERPL-MCNC: 121 MG/DL — HIGH (ref 70–99)
GLUCOSE SERPL-MCNC: 121 MG/DL — HIGH (ref 70–99)
HCT VFR BLD CALC: 28.3 % — LOW (ref 34.5–45)
HCT VFR BLD CALC: 28.3 % — LOW (ref 34.5–45)
HGB BLD-MCNC: 9.1 G/DL — LOW (ref 11.5–15.5)
HGB BLD-MCNC: 9.1 G/DL — LOW (ref 11.5–15.5)
MAGNESIUM SERPL-MCNC: 1.7 MG/DL — SIGNIFICANT CHANGE UP (ref 1.6–2.6)
MAGNESIUM SERPL-MCNC: 1.7 MG/DL — SIGNIFICANT CHANGE UP (ref 1.6–2.6)
MCHC RBC-ENTMCNC: 32.2 GM/DL — SIGNIFICANT CHANGE UP (ref 32–36)
MCHC RBC-ENTMCNC: 32.2 GM/DL — SIGNIFICANT CHANGE UP (ref 32–36)
MCHC RBC-ENTMCNC: 36.5 PG — HIGH (ref 27–34)
MCHC RBC-ENTMCNC: 36.5 PG — HIGH (ref 27–34)
MCV RBC AUTO: 113.7 FL — HIGH (ref 80–100)
MCV RBC AUTO: 113.7 FL — HIGH (ref 80–100)
NRBC # BLD: 0 /100 WBCS — SIGNIFICANT CHANGE UP (ref 0–0)
NRBC # BLD: 0 /100 WBCS — SIGNIFICANT CHANGE UP (ref 0–0)
PLATELET # BLD AUTO: 297 K/UL — SIGNIFICANT CHANGE UP (ref 150–400)
PLATELET # BLD AUTO: 297 K/UL — SIGNIFICANT CHANGE UP (ref 150–400)
POTASSIUM SERPL-MCNC: 4.1 MMOL/L — SIGNIFICANT CHANGE UP (ref 3.5–5.3)
POTASSIUM SERPL-MCNC: 4.1 MMOL/L — SIGNIFICANT CHANGE UP (ref 3.5–5.3)
POTASSIUM SERPL-SCNC: 4.1 MMOL/L — SIGNIFICANT CHANGE UP (ref 3.5–5.3)
POTASSIUM SERPL-SCNC: 4.1 MMOL/L — SIGNIFICANT CHANGE UP (ref 3.5–5.3)
RBC # BLD: 2.49 M/UL — LOW (ref 3.8–5.2)
RBC # BLD: 2.49 M/UL — LOW (ref 3.8–5.2)
RBC # FLD: 14.4 % — SIGNIFICANT CHANGE UP (ref 10.3–14.5)
RBC # FLD: 14.4 % — SIGNIFICANT CHANGE UP (ref 10.3–14.5)
SODIUM SERPL-SCNC: 141 MMOL/L — SIGNIFICANT CHANGE UP (ref 135–145)
SODIUM SERPL-SCNC: 141 MMOL/L — SIGNIFICANT CHANGE UP (ref 135–145)
SPECIMEN SOURCE: SIGNIFICANT CHANGE UP
SPECIMEN SOURCE: SIGNIFICANT CHANGE UP
WBC # BLD: 7.7 K/UL — SIGNIFICANT CHANGE UP (ref 3.8–10.5)
WBC # BLD: 7.7 K/UL — SIGNIFICANT CHANGE UP (ref 3.8–10.5)
WBC # FLD AUTO: 7.7 K/UL — SIGNIFICANT CHANGE UP (ref 3.8–10.5)
WBC # FLD AUTO: 7.7 K/UL — SIGNIFICANT CHANGE UP (ref 3.8–10.5)

## 2023-11-27 PROCEDURE — 99232 SBSQ HOSP IP/OBS MODERATE 35: CPT

## 2023-11-27 RX ORDER — RIVAROXABAN 15 MG-20MG
1 KIT ORAL
Qty: 1 | Refills: 0
Start: 2023-11-27

## 2023-11-27 RX ORDER — METOPROLOL TARTRATE 50 MG
50 TABLET ORAL ONCE
Refills: 0 | Status: DISCONTINUED | OUTPATIENT
Start: 2023-11-27 | End: 2023-11-27

## 2023-11-27 RX ADMIN — CALAMINE AND ZINC OXIDE AND PHENOL 1 APPLICATION(S): 160; 10 LOTION TOPICAL at 05:24

## 2023-11-27 RX ADMIN — Medication 1 MILLIGRAM(S): at 12:21

## 2023-11-27 RX ADMIN — POLYETHYLENE GLYCOL 3350 17 GRAM(S): 17 POWDER, FOR SOLUTION ORAL at 12:21

## 2023-11-27 RX ADMIN — APIXABAN 5 MILLIGRAM(S): 2.5 TABLET, FILM COATED ORAL at 12:21

## 2023-11-27 RX ADMIN — CALAMINE AND ZINC OXIDE AND PHENOL 1 APPLICATION(S): 160; 10 LOTION TOPICAL at 16:49

## 2023-11-27 RX ADMIN — LOSARTAN POTASSIUM 25 MILLIGRAM(S): 100 TABLET, FILM COATED ORAL at 05:24

## 2023-11-27 RX ADMIN — APIXABAN 5 MILLIGRAM(S): 2.5 TABLET, FILM COATED ORAL at 22:23

## 2023-11-27 RX ADMIN — Medication 100 MILLIGRAM(S): at 12:21

## 2023-11-27 RX ADMIN — SPIRONOLACTONE 12.5 MILLIGRAM(S): 25 TABLET, FILM COATED ORAL at 07:14

## 2023-11-27 RX ADMIN — Medication 50 MILLIGRAM(S): at 05:24

## 2023-11-27 NOTE — OCCUPATIONAL THERAPY INITIAL EVALUATION ADULT - ADDITIONAL COMMENTS
Patient reports living alone in an elevator access apartment building with ramp to enter. Patient states she was independent with all ADL's, IADL's and functional mobility with no AD prior to admission. Patient states she has a friend that comes by 3x a week to assist with taking her grocery shopping. Patient has a tub shower with shower chair and is L handed. Patient reports having 10 falls in the past month. Patient takes her time at home when changing positions and at times furniture and wall surfs.

## 2023-11-27 NOTE — PROVIDER CONTACT NOTE (OTHER) - SITUATION
PA and team notified for concerns about pt safety at home. Pt alert and oriented x 4, lives alone, frequent falls and will be discharged on blood thinners, PT strongly recommending SAVANNA, pt refusing.

## 2023-11-27 NOTE — PROGRESS NOTE ADULT - PROVIDER SPECIALTY LIST ADULT
Cardiology
Electrophysiology
Cardiology
2. The status of comorbities. (See ED/admit documents)
Cardiology

## 2023-11-27 NOTE — PROGRESS NOTE ADULT - PROBLEM SELECTOR PROBLEM 1
Traumatic subdural hematoma with loss of consciousness of 30 minutes or less
Syncope

## 2023-11-27 NOTE — PROGRESS NOTE ADULT - REASON FOR ADMISSION
syncope with prodrome of "lightheadedness"  Pt. noted to be in new Atrial Flutter 2:1 

## 2023-11-27 NOTE — PROGRESS NOTE ADULT - ASSESSMENT
74 y/o F poor medical follow-up (has not seen physician in 15-20 yrs.) presenting with recurrent syncope with +head strike with associated prodrome sxs of fatigue and lightheadedness. Course c/b stable 4mm SDH, new-onsent Aflutter RVR, and thrombocytopenia. Now s/p Aflutter ablation and currently in NSR.  72 y/o F poor medical follow-up (has not seen physician in 15-20 yrs.) presenting with recurrent syncope with +head strike with associated prodrome sxs of fatigue and lightheadedness. Course c/b stable 4mm SDH, new-onsent Aflutter RVR, and thrombocytopenia. Now s/p Aflutter ablation and currently in NSR.

## 2023-11-27 NOTE — OCCUPATIONAL THERAPY INITIAL EVALUATION ADULT - DIAGNOSIS, OT EVAL
Patient POD #3 s/p aflutter ablation, presents with decreased overall strength, balance, postural control, activity tolerance, safety awareness- impulsive at times, poor pacing impacting independence with functional activities and mobility.

## 2023-11-27 NOTE — PROVIDER CONTACT NOTE (OTHER) - BACKGROUND
Pt educated on blood thinner safety, fall prevention tips, medication compliance, avoiding hazards on the floor at home, importance of physical therapy compliance for fall prevention. pt refusing SAVANNA.

## 2023-11-27 NOTE — PROGRESS NOTE ADULT - PROBLEM SELECTOR PROBLEM 2
New onset atrial flutter

## 2023-11-27 NOTE — PROGRESS NOTE ADULT - NS ATTEND AMEND GEN_ALL_CORE FT
Syncope in setting of flutter with low V response and cardiomyopathy. Appropriate for flutter ablation and EPS post ANGELA.
72 y/o F poor medical follow-up (has not seen physician in 15-20 yrs.) presenting with recurrent syncope with +head strike with associated prodrome sxs of fatigue and lightheadedness. Course c/b stable 4mm SDH, new-onsent Aflutter RVR, and thrombocytopenia. Now s/p Aflutter ablation and currently in NSR.     1. AFlutter  s/p ablation, on NSR.  Continue eliquis--- coumadin in 30 days (will be given a free trial, but cannot afford meds)    syncope and subdural hematoma --- recommended for SAVANNA pt adamantly refuses it.

## 2023-11-27 NOTE — PROGRESS NOTE ADULT - PROBLEM SELECTOR PLAN 4
- Cleared for AC per Heme/Onc when PLT >50, Plt stable at 162 now  - Folate, B12, and Iron panel WNL; Elevated  though normal Hapto less likely for hemolytic source; PBS with smudge cells   - Heme/Onc following, appreciate recs    - Fluids: not indicated  - Replete Lytes PRN to K>4, Mg>2   - Diet: DASH   - DVT ppx: Eliquis   - PT eval: SAVANNA - Cleared for AC per Heme/Onc when PLT >50, Plt stable at 162 now  - Folate, B12, and Iron panel WNL; Elevated  though normal Hapto less likely for hemolytic source; PBS with smudge cells   - Heme/Onc following, appreciate recs    - Fluids: not indicated  - Replete Lytes PRN to K>4, Mg>2   - Diet: DASH   - DVT ppx: Eliquis   - PT eval: SAVANNA, pt refusing SAVANNA

## 2023-11-27 NOTE — OCCUPATIONAL THERAPY INITIAL EVALUATION ADULT - MODIFIED CLINICAL TEST OF SENSORY INTEGRATION IN BALANCE TEST
Patient functionally ambulated to the bathroom and in the hallway with Min forearm assist on the R side. Patient noted with cautious pacing, decreased step length

## 2023-11-27 NOTE — PROGRESS NOTE ADULT - SUBJECTIVE AND OBJECTIVE BOX
**incomplete**    Interventional Cardiology PA Adult Progress Note  Subjective Assessment:      ROS Negative except as per Subjective and HPI  	  MEDICATIONS:  losartan 25 milliGRAM(s) Oral daily  metoprolol succinate ER 50 milliGRAM(s) Oral once  metoprolol succinate ER 50 milliGRAM(s) Oral daily  spironolactone 12.5 milliGRAM(s) Oral daily  acetaminophen     Tablet .. 650 milliGRAM(s) Oral every 6 hours PRN  melatonin 5 milliGRAM(s) Oral at bedtime PRN  polyethylene glycol 3350 17 Gram(s) Oral daily  apixaban 5 milliGRAM(s) Oral two times a day  calamine/zinc oxide Lotion 1 Application(s) Topical two times a day  folic acid 1 milliGRAM(s) Oral daily  influenza  Vaccine (HIGH DOSE) 0.7 milliLiter(s) IntraMuscular once  thiamine 100 milliGRAM(s) Oral daily      [PHYSICAL EXAM:  TELEMETRY:  T(C): 36.7 (11-27-23 @ 05:15), Max: 36.8 (11-26-23 @ 20:42)  HR: 85 (11-27-23 @ 07:05) (65 - 85)  BP: 130/81 (11-27-23 @ 07:05) (107/68 - 151/75)  RR: 18 (11-27-23 @ 07:05) (18 - 19)  SpO2: 100% (11-27-23 @ 07:05) (97% - 100%)  Wt(kg): --  I&O's Summary    26 Nov 2023 07:01  -  27 Nov 2023 07:00  --------------------------------------------------------  IN: 650 mL / OUT: 1600 mL / NET: -950 mL        Weight (kg): 54.5 (11-27 @ 05:15)  [place]:  Central/PICC/Mid Line:                                         Appearance: Normal	  HEENT:   Normal oral mucosa, PERRL, EOMI	  Neck: Supple, + JVD/ - JVD; Carotid Bruit   Cardiovascular: Normal S1 S2, No JVD, No murmurs,   Respiratory: Lungs clear to auscultation/Decreased Breath Sounds/No Rales, Rhonchi, Wheezing	  Gastrointestinal:  Soft, Non-tender, + BS	  Skin: No rashes, No ecchymoses, No cyanosis  Extremities: Normal range of motion, No clubbing, cyanosis or edema  Vascular: Peripheral pulses palpable 2+ bilaterally  Neurologic: Non-focal  Psychiatry: A & O x 3, Mood & affect appropriate      	    ECG:  	  RADIOLOGY:   DIAGNOSTIC TESTING:  [ ] Echocardiogram:  [ ]  Catheterization:  [ ] Stress Test:    [ ] ANGELA  OTHER: 	    LABS:	 	  CARDIAC MARKERS:                                  9.1    7.70  )-----------( 297      ( 27 Nov 2023 05:30 )             28.3     11-27    141  |  103  |  11  ----------------------------<  121<H>  4.1   |  27  |  0.77    Ca    10.1      27 Nov 2023 05:30  Mg     1.7     11-27      proBNP:   Lipid Profile:   HgA1c:   TSH:       ASSESSMENT/PLAN: 	        DVT ppx:  Dispo:       Interventional Cardiology PA Adult Progress Note  Subjective Assessment:  Patient seen and examined at bedside. Patient denied chest pain, shortness of breath, abdominal pain, headache, lightheadeness, constipation.      ROS Negative except as per Subjective and HPI  	  MEDICATIONS:  losartan 25 milliGRAM(s) Oral daily  metoprolol succinate ER 50 milliGRAM(s) Oral once  metoprolol succinate ER 50 milliGRAM(s) Oral daily  spironolactone 12.5 milliGRAM(s) Oral daily  acetaminophen     Tablet .. 650 milliGRAM(s) Oral every 6 hours PRN  melatonin 5 milliGRAM(s) Oral at bedtime PRN  polyethylene glycol 3350 17 Gram(s) Oral daily  apixaban 5 milliGRAM(s) Oral two times a day  calamine/zinc oxide Lotion 1 Application(s) Topical two times a day  folic acid 1 milliGRAM(s) Oral daily  influenza  Vaccine (HIGH DOSE) 0.7 milliLiter(s) IntraMuscular once  thiamine 100 milliGRAM(s) Oral daily      [PHYSICAL EXAM:  TELEMETRY:  T(C): 36.7 (11-27-23 @ 05:15), Max: 36.8 (11-26-23 @ 20:42)  HR: 85 (11-27-23 @ 07:05) (65 - 85)  BP: 130/81 (11-27-23 @ 07:05) (107/68 - 151/75)  RR: 18 (11-27-23 @ 07:05) (18 - 19)  SpO2: 100% (11-27-23 @ 07:05) (97% - 100%)  Wt(kg): --  I&O's Summary    26 Nov 2023 07:01  -  27 Nov 2023 07:00  --------------------------------------------------------  IN: 650 mL / OUT: 1600 mL / NET: -950 mL        Weight (kg): 54.5 (11-27 @ 05:15)                                     Appearance: Normal	  Neck: Supple,- JVD   Cardiovascular:, No murmurs,   Respiratory: Lungs clear to auscultation  Neurologic: Non-focal  Psychiatry: A & O x 3, Mood & affect appropriate                                9.1    7.70  )-----------( 297      ( 27 Nov 2023 05:30 )             28.3     11-27    141  |  103  |  11  ----------------------------<  121<H>  4.1   |  27  |  0.77    Ca    10.1      27 Nov 2023 05:30  Mg     1.7     11-27      proBNP:   Lipid Profile:   HgA1c:   TSH:       ASSESSMENT/PLAN: 	        DVT ppx:  Dispo:       Interventional Cardiology PA Adult Progress Note  Subjective Assessment:  Patient seen and examined at bedside. Patient denied chest pain, shortness of breath, abdominal pain, headache, lightheadeness, constipation.      ROS Negative except as per Subjective and HPI  	  MEDICATIONS:  losartan 25 milliGRAM(s) Oral daily  metoprolol succinate ER 50 milliGRAM(s) Oral daily  spironolactone 12.5 milliGRAM(s) Oral daily  acetaminophen     Tablet .. 650 milliGRAM(s) Oral every 6 hours PRN  melatonin 5 milliGRAM(s) Oral at bedtime PRN  polyethylene glycol 3350 17 Gram(s) Oral daily  apixaban 5 milliGRAM(s) Oral two times a day  calamine/zinc oxide Lotion 1 Application(s) Topical two times a day  folic acid 1 milliGRAM(s) Oral daily  influenza  Vaccine (HIGH DOSE) 0.7 milliLiter(s) IntraMuscular once  thiamine 100 milliGRAM(s) Oral daily      [PHYSICAL EXAM:  TELEMETRY:  T(C): 36.7 (11-27-23 @ 05:15), Max: 36.8 (11-26-23 @ 20:42)  HR: 85 (11-27-23 @ 07:05) (65 - 85)  BP: 130/81 (11-27-23 @ 07:05) (107/68 - 151/75)  RR: 18 (11-27-23 @ 07:05) (18 - 19)  SpO2: 100% (11-27-23 @ 07:05) (97% - 100%)  Wt(kg): --  I&O's Summary    26 Nov 2023 07:01  -  27 Nov 2023 07:00  --------------------------------------------------------  IN: 650 mL / OUT: 1600 mL / NET: -950 mL        Weight (kg): 54.5 (11-27 @ 05:15)                                     Appearance: Normal	  Neck: Supple,- JVD   Cardiovascular:, No murmurs,   Respiratory: Lungs clear to auscultation  Neurologic: Non-focal  Psychiatry: A & O x 3, Mood & affect appropriate                                9.1    7.70  )-----------( 297      ( 27 Nov 2023 05:30 )             28.3     11-27    141  |  103  |  11  ----------------------------<  121<H>  4.1   |  27  |  0.77    Ca    10.1      27 Nov 2023 05:30  Mg     1.7     11-27      proBNP:   Lipid Profile:   HgA1c:   TSH:       ASSESSMENT/PLAN: 	        DVT ppx:  Dispo:

## 2023-11-27 NOTE — PROGRESS NOTE ADULT - PROBLEM SELECTOR PLAN 2
- AC: Eliquis 5mg BID   - Continue Metoprolol XL 50mg QD  - S/p ANGELA & Ablation (11/24/23); now currently NSR with frequent PACs  - EP consulted, appreciate recs - AC: Eliquis 5mg BID  - Continue Metoprolol XL 50mg QD  - S/p ANGELA & Ablation (11/24/23); now currently NSR with frequent PACs  - plan to dc pt w/ 1m eliquis coupon ($$) and continued AC mgmnt (Coumadin) outpt

## 2023-11-28 ENCOUNTER — TRANSCRIPTION ENCOUNTER (OUTPATIENT)
Age: 73
End: 2023-11-28

## 2023-11-28 VITALS — SYSTOLIC BLOOD PRESSURE: 106 MMHG | HEART RATE: 80 BPM | DIASTOLIC BLOOD PRESSURE: 61 MMHG

## 2023-11-28 PROBLEM — Z78.9 OTHER SPECIFIED HEALTH STATUS: Chronic | Status: ACTIVE | Noted: 2023-11-21

## 2023-11-28 LAB
ALBUMIN SERPL ELPH-MCNC: 3.5 G/DL — SIGNIFICANT CHANGE UP (ref 3.3–5)
ALBUMIN SERPL ELPH-MCNC: 3.5 G/DL — SIGNIFICANT CHANGE UP (ref 3.3–5)
ALP SERPL-CCNC: 70 U/L — SIGNIFICANT CHANGE UP (ref 40–120)
ALP SERPL-CCNC: 70 U/L — SIGNIFICANT CHANGE UP (ref 40–120)
ALT FLD-CCNC: 20 U/L — SIGNIFICANT CHANGE UP (ref 10–45)
ALT FLD-CCNC: 20 U/L — SIGNIFICANT CHANGE UP (ref 10–45)
ANION GAP SERPL CALC-SCNC: 10 MMOL/L — SIGNIFICANT CHANGE UP (ref 5–17)
ANION GAP SERPL CALC-SCNC: 10 MMOL/L — SIGNIFICANT CHANGE UP (ref 5–17)
AST SERPL-CCNC: 24 U/L — SIGNIFICANT CHANGE UP (ref 10–40)
AST SERPL-CCNC: 24 U/L — SIGNIFICANT CHANGE UP (ref 10–40)
BILIRUB SERPL-MCNC: 0.5 MG/DL — SIGNIFICANT CHANGE UP (ref 0.2–1.2)
BILIRUB SERPL-MCNC: 0.5 MG/DL — SIGNIFICANT CHANGE UP (ref 0.2–1.2)
BUN SERPL-MCNC: 10 MG/DL — SIGNIFICANT CHANGE UP (ref 7–23)
BUN SERPL-MCNC: 10 MG/DL — SIGNIFICANT CHANGE UP (ref 7–23)
CALCIUM SERPL-MCNC: 10 MG/DL — SIGNIFICANT CHANGE UP (ref 8.4–10.5)
CALCIUM SERPL-MCNC: 10 MG/DL — SIGNIFICANT CHANGE UP (ref 8.4–10.5)
CHLORIDE SERPL-SCNC: 100 MMOL/L — SIGNIFICANT CHANGE UP (ref 96–108)
CHLORIDE SERPL-SCNC: 100 MMOL/L — SIGNIFICANT CHANGE UP (ref 96–108)
CO2 SERPL-SCNC: 28 MMOL/L — SIGNIFICANT CHANGE UP (ref 22–31)
CO2 SERPL-SCNC: 28 MMOL/L — SIGNIFICANT CHANGE UP (ref 22–31)
CREAT SERPL-MCNC: 0.76 MG/DL — SIGNIFICANT CHANGE UP (ref 0.5–1.3)
CREAT SERPL-MCNC: 0.76 MG/DL — SIGNIFICANT CHANGE UP (ref 0.5–1.3)
EGFR: 83 ML/MIN/1.73M2 — SIGNIFICANT CHANGE UP
EGFR: 83 ML/MIN/1.73M2 — SIGNIFICANT CHANGE UP
GLUCOSE SERPL-MCNC: 134 MG/DL — HIGH (ref 70–99)
GLUCOSE SERPL-MCNC: 134 MG/DL — HIGH (ref 70–99)
HCT VFR BLD CALC: 29.1 % — LOW (ref 34.5–45)
HCT VFR BLD CALC: 29.1 % — LOW (ref 34.5–45)
HGB BLD-MCNC: 9.5 G/DL — LOW (ref 11.5–15.5)
HGB BLD-MCNC: 9.5 G/DL — LOW (ref 11.5–15.5)
MAGNESIUM SERPL-MCNC: 1.6 MG/DL — SIGNIFICANT CHANGE UP (ref 1.6–2.6)
MAGNESIUM SERPL-MCNC: 1.6 MG/DL — SIGNIFICANT CHANGE UP (ref 1.6–2.6)
MCHC RBC-ENTMCNC: 32.6 GM/DL — SIGNIFICANT CHANGE UP (ref 32–36)
MCHC RBC-ENTMCNC: 32.6 GM/DL — SIGNIFICANT CHANGE UP (ref 32–36)
MCHC RBC-ENTMCNC: 36.7 PG — HIGH (ref 27–34)
MCHC RBC-ENTMCNC: 36.7 PG — HIGH (ref 27–34)
MCV RBC AUTO: 112.4 FL — HIGH (ref 80–100)
MCV RBC AUTO: 112.4 FL — HIGH (ref 80–100)
NRBC # BLD: 0 /100 WBCS — SIGNIFICANT CHANGE UP (ref 0–0)
NRBC # BLD: 0 /100 WBCS — SIGNIFICANT CHANGE UP (ref 0–0)
PLATELET # BLD AUTO: 311 K/UL — SIGNIFICANT CHANGE UP (ref 150–400)
PLATELET # BLD AUTO: 311 K/UL — SIGNIFICANT CHANGE UP (ref 150–400)
POTASSIUM SERPL-MCNC: 4.1 MMOL/L — SIGNIFICANT CHANGE UP (ref 3.5–5.3)
POTASSIUM SERPL-MCNC: 4.1 MMOL/L — SIGNIFICANT CHANGE UP (ref 3.5–5.3)
POTASSIUM SERPL-SCNC: 4.1 MMOL/L — SIGNIFICANT CHANGE UP (ref 3.5–5.3)
POTASSIUM SERPL-SCNC: 4.1 MMOL/L — SIGNIFICANT CHANGE UP (ref 3.5–5.3)
PROT SERPL-MCNC: 6.4 G/DL — SIGNIFICANT CHANGE UP (ref 6–8.3)
PROT SERPL-MCNC: 6.4 G/DL — SIGNIFICANT CHANGE UP (ref 6–8.3)
RBC # BLD: 2.59 M/UL — LOW (ref 3.8–5.2)
RBC # BLD: 2.59 M/UL — LOW (ref 3.8–5.2)
RBC # FLD: 14.2 % — SIGNIFICANT CHANGE UP (ref 10.3–14.5)
RBC # FLD: 14.2 % — SIGNIFICANT CHANGE UP (ref 10.3–14.5)
SODIUM SERPL-SCNC: 138 MMOL/L — SIGNIFICANT CHANGE UP (ref 135–145)
SODIUM SERPL-SCNC: 138 MMOL/L — SIGNIFICANT CHANGE UP (ref 135–145)
WBC # BLD: 9.38 K/UL — SIGNIFICANT CHANGE UP (ref 3.8–10.5)
WBC # BLD: 9.38 K/UL — SIGNIFICANT CHANGE UP (ref 3.8–10.5)
WBC # FLD AUTO: 9.38 K/UL — SIGNIFICANT CHANGE UP (ref 3.8–10.5)
WBC # FLD AUTO: 9.38 K/UL — SIGNIFICANT CHANGE UP (ref 3.8–10.5)

## 2023-11-28 PROCEDURE — 87186 SC STD MICRODIL/AGAR DIL: CPT

## 2023-11-28 PROCEDURE — C8929: CPT

## 2023-11-28 PROCEDURE — 83540 ASSAY OF IRON: CPT

## 2023-11-28 PROCEDURE — 80048 BASIC METABOLIC PNL TOTAL CA: CPT

## 2023-11-28 PROCEDURE — 82728 ASSAY OF FERRITIN: CPT

## 2023-11-28 PROCEDURE — 85027 COMPLETE CBC AUTOMATED: CPT

## 2023-11-28 PROCEDURE — 87150 DNA/RNA AMPLIFIED PROBE: CPT

## 2023-11-28 PROCEDURE — C1766: CPT

## 2023-11-28 PROCEDURE — 86901 BLOOD TYPING SEROLOGIC RH(D): CPT

## 2023-11-28 PROCEDURE — 85610 PROTHROMBIN TIME: CPT

## 2023-11-28 PROCEDURE — 83605 ASSAY OF LACTIC ACID: CPT

## 2023-11-28 PROCEDURE — 97116 GAIT TRAINING THERAPY: CPT

## 2023-11-28 PROCEDURE — C1894: CPT

## 2023-11-28 PROCEDURE — 82746 ASSAY OF FOLIC ACID SERUM: CPT

## 2023-11-28 PROCEDURE — 93312 ECHO TRANSESOPHAGEAL: CPT

## 2023-11-28 PROCEDURE — 84156 ASSAY OF PROTEIN URINE: CPT

## 2023-11-28 PROCEDURE — 84100 ASSAY OF PHOSPHORUS: CPT

## 2023-11-28 PROCEDURE — 97165 OT EVAL LOW COMPLEX 30 MIN: CPT

## 2023-11-28 PROCEDURE — 36415 COLL VENOUS BLD VENIPUNCTURE: CPT

## 2023-11-28 PROCEDURE — 81001 URINALYSIS AUTO W/SCOPE: CPT

## 2023-11-28 PROCEDURE — 82248 BILIRUBIN DIRECT: CPT

## 2023-11-28 PROCEDURE — 96374 THER/PROPH/DIAG INJ IV PUSH: CPT

## 2023-11-28 PROCEDURE — 83010 ASSAY OF HAPTOGLOBIN QUANT: CPT

## 2023-11-28 PROCEDURE — 86803 HEPATITIS C AB TEST: CPT

## 2023-11-28 PROCEDURE — 99239 HOSP IP/OBS DSCHRG MGMT >30: CPT

## 2023-11-28 PROCEDURE — C1760: CPT

## 2023-11-28 PROCEDURE — 93005 ELECTROCARDIOGRAM TRACING: CPT

## 2023-11-28 PROCEDURE — 83690 ASSAY OF LIPASE: CPT

## 2023-11-28 PROCEDURE — 82962 GLUCOSE BLOOD TEST: CPT

## 2023-11-28 PROCEDURE — 84443 ASSAY THYROID STIM HORMONE: CPT

## 2023-11-28 PROCEDURE — 96375 TX/PRO/DX INJ NEW DRUG ADDON: CPT

## 2023-11-28 PROCEDURE — 83735 ASSAY OF MAGNESIUM: CPT

## 2023-11-28 PROCEDURE — 83615 LACTATE (LD) (LDH) ENZYME: CPT

## 2023-11-28 PROCEDURE — 97530 THERAPEUTIC ACTIVITIES: CPT

## 2023-11-28 PROCEDURE — 84540 ASSAY OF URINE/UREA-N: CPT

## 2023-11-28 PROCEDURE — 84145 PROCALCITONIN (PCT): CPT

## 2023-11-28 PROCEDURE — 80061 LIPID PANEL: CPT

## 2023-11-28 PROCEDURE — 99285 EMERGENCY DEPT VISIT HI MDM: CPT | Mod: 25

## 2023-11-28 PROCEDURE — 80053 COMPREHEN METABOLIC PANEL: CPT

## 2023-11-28 PROCEDURE — 71045 X-RAY EXAM CHEST 1 VIEW: CPT

## 2023-11-28 PROCEDURE — 84484 ASSAY OF TROPONIN QUANT: CPT

## 2023-11-28 PROCEDURE — 86900 BLOOD TYPING SEROLOGIC ABO: CPT

## 2023-11-28 PROCEDURE — 82550 ASSAY OF CK (CPK): CPT

## 2023-11-28 PROCEDURE — 87086 URINE CULTURE/COLONY COUNT: CPT

## 2023-11-28 PROCEDURE — 85730 THROMBOPLASTIN TIME PARTIAL: CPT

## 2023-11-28 PROCEDURE — 83036 HEMOGLOBIN GLYCOSYLATED A1C: CPT

## 2023-11-28 PROCEDURE — 97161 PT EVAL LOW COMPLEX 20 MIN: CPT

## 2023-11-28 PROCEDURE — 82553 CREATINE MB FRACTION: CPT

## 2023-11-28 PROCEDURE — C1730: CPT

## 2023-11-28 PROCEDURE — 85025 COMPLETE CBC W/AUTO DIFF WBC: CPT

## 2023-11-28 PROCEDURE — 84300 ASSAY OF URINE SODIUM: CPT

## 2023-11-28 PROCEDURE — 80076 HEPATIC FUNCTION PANEL: CPT

## 2023-11-28 PROCEDURE — 86850 RBC ANTIBODY SCREEN: CPT

## 2023-11-28 PROCEDURE — 82607 VITAMIN B-12: CPT

## 2023-11-28 PROCEDURE — 87040 BLOOD CULTURE FOR BACTERIA: CPT

## 2023-11-28 PROCEDURE — 83550 IRON BINDING TEST: CPT

## 2023-11-28 PROCEDURE — C2630: CPT

## 2023-11-28 PROCEDURE — 86140 C-REACTIVE PROTEIN: CPT

## 2023-11-28 PROCEDURE — 82570 ASSAY OF URINE CREATININE: CPT

## 2023-11-28 PROCEDURE — 83935 ASSAY OF URINE OSMOLALITY: CPT

## 2023-11-28 PROCEDURE — 84133 ASSAY OF URINE POTASSIUM: CPT

## 2023-11-28 PROCEDURE — 70450 CT HEAD/BRAIN W/O DYE: CPT

## 2023-11-28 PROCEDURE — 0225U NFCT DS DNA&RNA 21 SARSCOV2: CPT

## 2023-11-28 PROCEDURE — 83880 ASSAY OF NATRIURETIC PEPTIDE: CPT

## 2023-11-28 RX ORDER — SPIRONOLACTONE 25 MG/1
0.5 TABLET, FILM COATED ORAL
Qty: 15 | Refills: 3
Start: 2023-11-28 | End: 2024-03-26

## 2023-11-28 RX ORDER — METOPROLOL TARTRATE 50 MG
25 TABLET ORAL DAILY
Refills: 0 | Status: DISCONTINUED | OUTPATIENT
Start: 2023-11-29 | End: 2023-11-28

## 2023-11-28 RX ORDER — APIXABAN 2.5 MG/1
1 TABLET, FILM COATED ORAL
Qty: 60 | Refills: 1
Start: 2023-11-28 | End: 2024-01-26

## 2023-11-28 RX ORDER — FOLIC ACID 0.8 MG
1 TABLET ORAL
Qty: 30 | Refills: 3
Start: 2023-11-28 | End: 2024-03-26

## 2023-11-28 RX ORDER — THIAMINE MONONITRATE (VIT B1) 100 MG
1 TABLET ORAL
Qty: 30 | Refills: 3
Start: 2023-11-28 | End: 2024-03-26

## 2023-11-28 RX ORDER — LOSARTAN POTASSIUM 100 MG/1
1 TABLET, FILM COATED ORAL
Qty: 30 | Refills: 6
Start: 2023-11-28 | End: 2024-06-24

## 2023-11-28 RX ORDER — SODIUM CHLORIDE 9 MG/ML
250 INJECTION INTRAMUSCULAR; INTRAVENOUS; SUBCUTANEOUS ONCE
Refills: 0 | Status: COMPLETED | OUTPATIENT
Start: 2023-11-28 | End: 2023-11-28

## 2023-11-28 RX ORDER — METOPROLOL TARTRATE 50 MG
1 TABLET ORAL
Qty: 30 | Refills: 3
Start: 2023-11-28 | End: 2024-03-26

## 2023-11-28 RX ORDER — BENZOCAINE AND MENTHOL 5; 1 G/100ML; G/100ML
1 LIQUID ORAL THREE TIMES A DAY
Refills: 0 | Status: DISCONTINUED | OUTPATIENT
Start: 2023-11-28 | End: 2023-11-28

## 2023-11-28 RX ADMIN — Medication 50 MILLIGRAM(S): at 06:34

## 2023-11-28 RX ADMIN — Medication 1 MILLIGRAM(S): at 11:53

## 2023-11-28 RX ADMIN — LOSARTAN POTASSIUM 25 MILLIGRAM(S): 100 TABLET, FILM COATED ORAL at 06:34

## 2023-11-28 RX ADMIN — CALAMINE AND ZINC OXIDE AND PHENOL 1 APPLICATION(S): 160; 10 LOTION TOPICAL at 06:35

## 2023-11-28 RX ADMIN — Medication 100 MILLIGRAM(S): at 11:51

## 2023-11-28 RX ADMIN — SODIUM CHLORIDE 500 MILLILITER(S): 9 INJECTION INTRAMUSCULAR; INTRAVENOUS; SUBCUTANEOUS at 11:50

## 2023-11-28 RX ADMIN — BENZOCAINE AND MENTHOL 1 LOZENGE: 5; 1 LIQUID ORAL at 13:41

## 2023-11-28 RX ADMIN — SPIRONOLACTONE 12.5 MILLIGRAM(S): 25 TABLET, FILM COATED ORAL at 06:35

## 2023-11-28 RX ADMIN — APIXABAN 5 MILLIGRAM(S): 2.5 TABLET, FILM COATED ORAL at 11:49

## 2023-11-28 NOTE — DISCHARGE NOTE NURSING/CASE MANAGEMENT/SOCIAL WORK - NSDCPEFALRISK_GEN_ALL_CORE
For information on Fall & Injury Prevention, visit: [email or web address] OR  [email or web address] OR  https://www.cdc.gov/steadi/patient.html

## 2023-11-28 NOTE — DISCHARGE NOTE NURSING/CASE MANAGEMENT/SOCIAL WORK - PATIENT PORTAL LINK FT
You can access the FollowMyHealth Patient Portal offered by [facility] by registering at the following website: [email or web address]. By joining [employer]’s FollowMyHealth portal, you will also be able to view your health information using other applications (apps) compatible with our system.

## 2023-11-30 ENCOUNTER — APPOINTMENT (OUTPATIENT)
Dept: HEART AND VASCULAR | Facility: CLINIC | Age: 73
End: 2023-11-30

## 2023-11-30 PROBLEM — Z00.00 ENCOUNTER FOR PREVENTIVE HEALTH EXAMINATION: Status: ACTIVE | Noted: 2023-11-30

## 2023-11-30 LAB
CULTURE RESULTS: SIGNIFICANT CHANGE UP
CULTURE RESULTS: SIGNIFICANT CHANGE UP
SPECIMEN SOURCE: SIGNIFICANT CHANGE UP
SPECIMEN SOURCE: SIGNIFICANT CHANGE UP

## 2023-12-04 DIAGNOSIS — N17.9 ACUTE KIDNEY FAILURE, UNSPECIFIED: ICD-10-CM

## 2023-12-04 DIAGNOSIS — R29.6 REPEATED FALLS: ICD-10-CM

## 2023-12-04 DIAGNOSIS — I42.9 CARDIOMYOPATHY, UNSPECIFIED: ICD-10-CM
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
## 2023-12-04 DIAGNOSIS — I48.3 TYPICAL ATRIAL FLUTTER: ICD-10-CM

## 2023-12-04 DIAGNOSIS — S06.5X1A TRAUMATIC SUBDURAL HEMORRHAGE WITH LOSS OF CONSCIOUSNESS OF 30 MINUTES OR LESS, INITIAL ENCOUNTER: ICD-10-CM

## 2023-12-04 DIAGNOSIS — D69.6 THROMBOCYTOPENIA, UNSPECIFIED: ICD-10-CM

## 2023-12-04 DIAGNOSIS — I50.20 UNSPECIFIED SYSTOLIC (CONGESTIVE) HEART FAILURE: ICD-10-CM
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
## 2023-12-12 ENCOUNTER — APPOINTMENT (OUTPATIENT)
Dept: NEUROSURGERY | Facility: CLINIC | Age: 73
End: 2023-12-12
Payer: MEDICARE

## 2023-12-12 DIAGNOSIS — Z91.81 HISTORY OF FALLING: ICD-10-CM

## 2023-12-12 DIAGNOSIS — S06.5XAA TRAUMATIC SUBDURAL HEMORRHAGE WITH LOSS OF CONSCIOUSNESS STATUS UNKNOWN, INITIAL ENCOUNTER: ICD-10-CM

## 2023-12-12 PROCEDURE — 99442: CPT

## 2023-12-13 ENCOUNTER — APPOINTMENT (OUTPATIENT)
Dept: HEART AND VASCULAR | Facility: CLINIC | Age: 73
End: 2023-12-13
Payer: MEDICARE

## 2023-12-13 ENCOUNTER — RX RENEWAL (OUTPATIENT)
Age: 73
End: 2023-12-13

## 2023-12-13 VITALS
HEART RATE: 91 BPM | OXYGEN SATURATION: 100 % | HEIGHT: 64 IN | TEMPERATURE: 97.7 F | WEIGHT: 140 LBS | SYSTOLIC BLOOD PRESSURE: 112 MMHG | DIASTOLIC BLOOD PRESSURE: 72 MMHG | BODY MASS INDEX: 23.9 KG/M2

## 2023-12-13 DIAGNOSIS — I43 TACHYCARDIA, UNSPECIFIED: ICD-10-CM

## 2023-12-13 DIAGNOSIS — Z98.890 OTHER SPECIFIED POSTPROCEDURAL STATES: ICD-10-CM

## 2023-12-13 DIAGNOSIS — I50.20 UNSPECIFIED SYSTOLIC (CONGESTIVE) HEART FAILURE: ICD-10-CM

## 2023-12-13 DIAGNOSIS — Z78.9 OTHER SPECIFIED HEALTH STATUS: ICD-10-CM

## 2023-12-13 DIAGNOSIS — D69.6 THROMBOCYTOPENIA, UNSPECIFIED: ICD-10-CM

## 2023-12-13 DIAGNOSIS — I48.92 UNSPECIFIED ATRIAL FLUTTER: ICD-10-CM

## 2023-12-13 DIAGNOSIS — R00.0 TACHYCARDIA, UNSPECIFIED: ICD-10-CM

## 2023-12-13 PROCEDURE — 99215 OFFICE O/P EST HI 40 MIN: CPT

## 2023-12-13 RX ORDER — GABAPENTIN 100 MG
100 TABLET ORAL
Refills: 0 | Status: ACTIVE | COMMUNITY

## 2023-12-13 RX ORDER — LOSARTAN POTASSIUM 25 MG/1
25 TABLET, FILM COATED ORAL DAILY
Qty: 30 | Refills: 3 | Status: ACTIVE | COMMUNITY
Start: 1900-01-01 | End: 1900-01-01

## 2023-12-13 RX ORDER — HYDROCORTISONE 1 %
12 CREAM (GRAM) TOPICAL
Refills: 0 | Status: ACTIVE | COMMUNITY

## 2023-12-13 RX ORDER — FOLIC ACID 1 MG/1
1 TABLET ORAL
Refills: 0 | Status: ACTIVE | COMMUNITY

## 2023-12-13 RX ORDER — THIAMINE MONONITRATE (VIT B1) 100 MG
100 TABLET ORAL
Refills: 0 | Status: ACTIVE | COMMUNITY

## 2023-12-13 RX ORDER — SPIRONOLACTONE 25 MG/1
25 TABLET ORAL DAILY
Qty: 90 | Refills: 1 | Status: ACTIVE | COMMUNITY
Start: 1900-01-01 | End: 1900-01-01

## 2023-12-13 RX ORDER — KETOCONAZOLE 20 MG/G
2 CREAM TOPICAL
Refills: 0 | Status: ACTIVE | COMMUNITY

## 2023-12-13 RX ORDER — APIXABAN 5 MG/1
5 TABLET, FILM COATED ORAL
Qty: 60 | Refills: 3 | Status: ACTIVE | COMMUNITY
Start: 1900-01-01 | End: 1900-01-01

## 2023-12-13 RX ORDER — PANTOPRAZOLE SODIUM 40 MG/1
40 GRANULE, DELAYED RELEASE ORAL
Refills: 0 | Status: ACTIVE | COMMUNITY

## 2023-12-13 RX ORDER — ANTIARTHRITIC COMBINATION NO.2 900 MG
TABLET ORAL
Refills: 0 | Status: ACTIVE | COMMUNITY

## 2023-12-13 NOTE — REASON FOR VISIT
[FreeTextEntry1] : CV Data: TTE (11/22/23): EF 30-35% with mod-sev TR; BIAE  ANGELA 11/24/23: negative for thrombus. S/p AFL ablation

## 2023-12-13 NOTE — HISTORY OF PRESENT ILLNESS
[FreeTextEntry1] : 73F w no known medical hx (no medical contact in last decade) p/w recurrent pre-syncope w head injury found to be in Atrial flutter s/p ablation by Dr Payan. - insists she fell because she fell because she tripped - head imaging w stable 4mm SDH, cleared for AC by neurosurgery. Also cleared by hemonc who evaluated pt for thrombocytopenia.  - TTE 11/22 with LVEF 30-35%. ANGELA AFL ablation done 11/2/23. Was in [facility] 11/21-28/23. DC meds: apixaban 5 mg bid, losartan 25 mg, spironolactone 25, metop succ 25 mg od, thiamine+folate, Iron  #HFrEF - suspect tachy-mediated CMP. Optimizing GDMT w metop succ 100 mg daily, losartan 25 mg, start MRA. plan to repeat TTE after 3 months of good med rx (Feb 2024) #AFL s/p ablation - continue AC indefinitely as long as platelet cts remain >50 k per hemonc. repeat CBC on FU.

## 2023-12-14 LAB
ANION GAP SERPL CALC-SCNC: 12 MMOL/L
BUN SERPL-MCNC: 14 MG/DL
CALCIUM SERPL-MCNC: 9.9 MG/DL
CHLORIDE SERPL-SCNC: 103 MMOL/L
CO2 SERPL-SCNC: 27 MMOL/L
CREAT SERPL-MCNC: 0.85 MG/DL
EGFR: 72 ML/MIN/1.73M2
GLUCOSE SERPL-MCNC: 102 MG/DL
HCT VFR BLD CALC: 30.3 %
HGB BLD-MCNC: 9.7 G/DL
MCHC RBC-ENTMCNC: 32 GM/DL
MCHC RBC-ENTMCNC: 36.6 PG
MCV RBC AUTO: 114.3 FL
PLATELET # BLD AUTO: 255 K/UL
POTASSIUM SERPL-SCNC: 4.2 MMOL/L
RBC # BLD: 2.65 M/UL
RBC # FLD: 14.7 %
SODIUM SERPL-SCNC: 142 MMOL/L
WBC # FLD AUTO: 7.81 K/UL

## 2023-12-21 ENCOUNTER — RX RENEWAL (OUTPATIENT)
Age: 73
End: 2023-12-21

## 2023-12-21 RX ORDER — METOPROLOL SUCCINATE 25 MG/1
25 TABLET, EXTENDED RELEASE ORAL DAILY
Qty: 90 | Refills: 3 | Status: ACTIVE | COMMUNITY
Start: 1900-01-01 | End: 1900-01-01

## 2024-01-16 DIAGNOSIS — Z12.11 ENCOUNTER FOR SCREENING FOR MALIGNANT NEOPLASM OF COLON: ICD-10-CM

## 2024-01-16 DIAGNOSIS — Z13.1 ENCOUNTER FOR SCREENING FOR DIABETES MELLITUS: ICD-10-CM

## 2024-01-16 DIAGNOSIS — Z13.220 ENCOUNTER FOR SCREENING FOR LIPOID DISORDERS: ICD-10-CM

## 2024-01-16 DIAGNOSIS — Z12.39 ENCOUNTER FOR OTHER SCREENING FOR MALIGNANT NEOPLASM OF BREAST: ICD-10-CM

## 2024-01-16 DIAGNOSIS — Z23 ENCOUNTER FOR IMMUNIZATION: ICD-10-CM

## 2024-01-16 DIAGNOSIS — Z91.89 OTHER SPECIFIED PERSONAL RISK FACTORS, NOT ELSEWHERE CLASSIFIED: ICD-10-CM

## 2024-01-16 DIAGNOSIS — D53.9 NUTRITIONAL ANEMIA, UNSPECIFIED: ICD-10-CM

## 2024-01-16 DIAGNOSIS — K21.9 GASTRO-ESOPHAGEAL REFLUX DISEASE W/OUT ESOPHAGITIS: ICD-10-CM

## 2024-01-16 NOTE — ASSESSMENT
[FreeTextEntry1] : Health Maintenance Her BMI is good and no weight loss is currently recommended. Daily aerobic exercises strongly recommended. No STD risk or substance abuse per patient report. Occasional gender specific self-examination is suggested. Consider biyearly mammography and DEXA scan. Hepatitis C antibody sent with patient approval. No depression. Competent with ADLs. Colonoscopy is not due this year. Yearly flu vaccine is recommended.  S/p ablation for atrial flutter  Systolic CHF  Mild thrombocytopenia

## 2024-01-16 NOTE — HISTORY OF PRESENT ILLNESS
[FreeTextEntry1] : 73-year-old female presents to establish medical care and for initial examination [de-identified] : Patient currently on treatment with apixaban following ablation procedure for atrial flutter, on 11/24/2023 as well as on treatment for HTN, systolic CHF, and GERD.   She was admitted to [facility] on 11/21 for complaint of dizziness/frequent falls and was found to have a 4 mm right subacute vs chronic subdural hematoma (which has since been stable) in addition to new diagnosis of atrial fibrillation for which she was treated with the ablation a few days later.

## 2024-01-17 ENCOUNTER — APPOINTMENT (OUTPATIENT)
Dept: INTERNAL MEDICINE | Facility: CLINIC | Age: 74
End: 2024-01-17

## 2024-01-27 ENCOUNTER — EMERGENCY (EMERGENCY)
Facility: HOSPITAL | Age: 74
LOS: 1 days | Discharge: HOME | End: 2024-01-27
Attending: STUDENT IN AN ORGANIZED HEALTH CARE EDUCATION/TRAINING PROGRAM | Admitting: STUDENT IN AN ORGANIZED HEALTH CARE EDUCATION/TRAINING PROGRAM
Payer: MEDICARE

## 2024-01-27 VITALS
SYSTOLIC BLOOD PRESSURE: 117 MMHG | DIASTOLIC BLOOD PRESSURE: 75 MMHG | OXYGEN SATURATION: 100 % | RESPIRATION RATE: 18 BRPM | TEMPERATURE: 98 F | HEART RATE: 80 BPM

## 2024-01-27 VITALS
WEIGHT: 139.99 LBS | SYSTOLIC BLOOD PRESSURE: 95 MMHG | DIASTOLIC BLOOD PRESSURE: 69 MMHG | HEIGHT: 65 IN | HEART RATE: 84 BPM | OXYGEN SATURATION: 97 % | RESPIRATION RATE: 18 BRPM | TEMPERATURE: 98 F

## 2024-01-27 DIAGNOSIS — R51.9 HEADACHE, UNSPECIFIED: ICD-10-CM

## 2024-01-27 DIAGNOSIS — I48.92 UNSPECIFIED ATRIAL FLUTTER: ICD-10-CM

## 2024-01-27 DIAGNOSIS — G89.11 ACUTE PAIN DUE TO TRAUMA: ICD-10-CM

## 2024-01-27 DIAGNOSIS — Z79.01 LONG TERM (CURRENT) USE OF ANTICOAGULANTS: ICD-10-CM

## 2024-01-27 DIAGNOSIS — Z86.79 PERSONAL HISTORY OF OTHER DISEASES OF THE CIRCULATORY SYSTEM: ICD-10-CM

## 2024-01-27 DIAGNOSIS — R29.6 REPEATED FALLS: ICD-10-CM

## 2024-01-27 DIAGNOSIS — R42 DIZZINESS AND GIDDINESS: ICD-10-CM

## 2024-01-27 DIAGNOSIS — Z90.710 ACQUIRED ABSENCE OF BOTH CERVIX AND UTERUS: Chronic | ICD-10-CM

## 2024-01-27 LAB
ANION GAP SERPL CALC-SCNC: 11 MMOL/L — SIGNIFICANT CHANGE UP (ref 5–17)
ANISOCYTOSIS BLD QL: SLIGHT — SIGNIFICANT CHANGE UP
BASOPHILS # BLD AUTO: 0 K/UL — SIGNIFICANT CHANGE UP (ref 0–0.2)
BASOPHILS NFR BLD AUTO: 0 % — SIGNIFICANT CHANGE UP (ref 0–2)
BUN SERPL-MCNC: 20 MG/DL — SIGNIFICANT CHANGE UP (ref 7–23)
CALCIUM SERPL-MCNC: 9.2 MG/DL — SIGNIFICANT CHANGE UP (ref 8.4–10.5)
CHLORIDE SERPL-SCNC: 99 MMOL/L — SIGNIFICANT CHANGE UP (ref 96–108)
CO2 SERPL-SCNC: 27 MMOL/L — SIGNIFICANT CHANGE UP (ref 22–31)
CREAT SERPL-MCNC: 1.01 MG/DL — SIGNIFICANT CHANGE UP (ref 0.5–1.3)
DACRYOCYTES BLD QL SMEAR: SLIGHT — SIGNIFICANT CHANGE UP
EGFR: 59 ML/MIN/1.73M2 — LOW
EOSINOPHIL # BLD AUTO: 0.64 K/UL — HIGH (ref 0–0.5)
EOSINOPHIL NFR BLD AUTO: 8 % — HIGH (ref 0–6)
GIANT PLATELETS BLD QL SMEAR: PRESENT — SIGNIFICANT CHANGE UP
GLUCOSE SERPL-MCNC: 179 MG/DL — HIGH (ref 70–99)
HCT VFR BLD CALC: 30.3 % — LOW (ref 34.5–45)
HGB BLD-MCNC: 10 G/DL — LOW (ref 11.5–15.5)
LYMPHOCYTES # BLD AUTO: 0.85 K/UL — LOW (ref 1–3.3)
LYMPHOCYTES # BLD AUTO: 10.7 % — LOW (ref 13–44)
MACROCYTES BLD QL: SLIGHT — SIGNIFICANT CHANGE UP
MANUAL SMEAR VERIFICATION: SIGNIFICANT CHANGE UP
MCHC RBC-ENTMCNC: 33 GM/DL — SIGNIFICANT CHANGE UP (ref 32–36)
MCHC RBC-ENTMCNC: 35.3 PG — HIGH (ref 27–34)
MCV RBC AUTO: 107.1 FL — HIGH (ref 80–100)
MONOCYTES # BLD AUTO: 0.07 K/UL — SIGNIFICANT CHANGE UP (ref 0–0.9)
MONOCYTES NFR BLD AUTO: 0.9 % — LOW (ref 2–14)
NEUTROPHILS # BLD AUTO: 6.41 K/UL — SIGNIFICANT CHANGE UP (ref 1.8–7.4)
NEUTROPHILS NFR BLD AUTO: 79.5 % — HIGH (ref 43–77)
NEUTS BAND # BLD: 0.9 % — SIGNIFICANT CHANGE UP (ref 0–8)
PLAT MORPH BLD: ABNORMAL
PLATELET # BLD AUTO: 195 K/UL — SIGNIFICANT CHANGE UP (ref 150–400)
POIKILOCYTOSIS BLD QL AUTO: SLIGHT — SIGNIFICANT CHANGE UP
POLYCHROMASIA BLD QL SMEAR: SLIGHT — SIGNIFICANT CHANGE UP
POTASSIUM SERPL-MCNC: 3.3 MMOL/L — LOW (ref 3.5–5.3)
POTASSIUM SERPL-SCNC: 3.3 MMOL/L — LOW (ref 3.5–5.3)
RBC # BLD: 2.83 M/UL — LOW (ref 3.8–5.2)
RBC # FLD: 13.6 % — SIGNIFICANT CHANGE UP (ref 10.3–14.5)
RBC BLD AUTO: ABNORMAL
SMUDGE CELLS # BLD: PRESENT — SIGNIFICANT CHANGE UP
SODIUM SERPL-SCNC: 137 MMOL/L — SIGNIFICANT CHANGE UP (ref 135–145)
STOMATOCYTES BLD QL SMEAR: SLIGHT — SIGNIFICANT CHANGE UP
WBC # BLD: 7.97 K/UL — SIGNIFICANT CHANGE UP (ref 3.8–10.5)
WBC # FLD AUTO: 7.97 K/UL — SIGNIFICANT CHANGE UP (ref 3.8–10.5)

## 2024-01-27 PROCEDURE — 85025 COMPLETE CBC W/AUTO DIFF WBC: CPT

## 2024-01-27 PROCEDURE — 93005 ELECTROCARDIOGRAM TRACING: CPT

## 2024-01-27 PROCEDURE — 93010 ELECTROCARDIOGRAM REPORT: CPT

## 2024-01-27 PROCEDURE — 99285 EMERGENCY DEPT VISIT HI MDM: CPT | Mod: 25

## 2024-01-27 PROCEDURE — 80048 BASIC METABOLIC PNL TOTAL CA: CPT

## 2024-01-27 PROCEDURE — 70450 CT HEAD/BRAIN W/O DYE: CPT | Mod: 26,MA

## 2024-01-27 PROCEDURE — 36415 COLL VENOUS BLD VENIPUNCTURE: CPT

## 2024-01-27 PROCEDURE — 99285 EMERGENCY DEPT VISIT HI MDM: CPT

## 2024-01-27 PROCEDURE — 70450 CT HEAD/BRAIN W/O DYE: CPT | Mod: MA

## 2024-01-27 RX ORDER — POTASSIUM CHLORIDE 20 MEQ
40 PACKET (EA) ORAL ONCE
Refills: 0 | Status: COMPLETED | OUTPATIENT
Start: 2024-01-27 | End: 2024-01-27

## 2024-01-27 RX ORDER — ACETAMINOPHEN 500 MG
1000 TABLET ORAL ONCE
Refills: 0 | Status: COMPLETED | OUTPATIENT
Start: 2024-01-27 | End: 2024-01-27

## 2024-01-27 RX ADMIN — Medication 40 MILLIEQUIVALENT(S): at 14:04

## 2024-01-27 RX ADMIN — Medication 1000 MILLIGRAM(S): at 13:17

## 2024-01-27 NOTE — ED PROVIDER NOTE - NSFOLLOWUPINSTRUCTIONS_ED_ALL_ED_FT
Please take tylenol as needed for headache. Return for worsening symptoms, lightheadedness, chest pain, blurry vision. Please stay well hydrated and follow up with your primary physician.    I hope you feel better soon!    Sincerely,  Quintin Singletary MD <<----- Click to add NO pertinent Family History

## 2024-01-27 NOTE — ED ADULT NURSE NOTE - NSFALLRISKINTERV_ED_ALL_ED

## 2024-01-27 NOTE — ED PROVIDER NOTE - CLINICAL SUMMARY MEDICAL DECISION MAKING FREE TEXT BOX
74 yo f with lightheadedness, headache, neuro intact, ekg nsr, will check labs, ro electrolyte disturbance, anemia, ct head, reassess.

## 2024-01-27 NOTE — ED PROVIDER NOTE - OBJECTIVE STATEMENT
73 year old female with history of aflutter sp ablation on eliquis, frequent falls c/b subdural hematoma, recent admission in Dec, offered SAVANNA but pt declines, presenting with persistent headaches since fall. Also intermittent lightheadedness. No cp or sob or palpiations. No abd pain, no n/v/ no melena or brbpr. No other acute complaints. ROS as above.

## 2024-01-27 NOTE — ED PROVIDER NOTE - NS ED ROS FT
Constitutional: No fever or chills  Eyes: No discharge or drainage  Ears, Nose, Mouth, Throat: No nasal discharge, no sore throat  Cardiovascular: No chest pain, no palpitations  Respiratory: No shortness of breath, no cough  Gastrointestinal: No nausea or vomiting, no abdominal pain, no diarrhea or constipation  Musculoskeletal: No joint pain, no swelling  Skin: No rashes or lesions  Neurological: No numbness, weakness, tingling, no headache, lightheadedness

## 2024-01-27 NOTE — ED ADULT NURSE NOTE - OBJECTIVE STATEMENT
73 y.o female c/o back of head/neck burning when tilting head forward. Pain resolved when head placed upright. Pt reports last fall 2 weeks ago, ambulatory with cane. "I feel itchy all over my body when I wake up in the morning". Denies HA, cp, sob, vision changes, dizziness at present. Denies anticoagulant use.

## 2024-01-27 NOTE — ED PROVIDER NOTE - PHYSICAL EXAMINATION
general: Well appearing, in no acute distress  HEENT: Normocephalic, atraumatic, extraocular movements intact  CV: Regular rate  Pulm: No respiratory distress, no tachypnea  Abd: Flat, no gross distension  Ext: warm and well perfused  Skin: No gross rashes or lesions  Neuro: Alert and oriented, moving all extremities, CN II-XII intact, sensation intact bilaterally, motor intact bilaterally

## 2024-01-27 NOTE — ED PROVIDER NOTE - PATIENT PORTAL LINK FT
You can access the FollowMyHealth Patient Portal offered by [facility] by registering at the following website: [email or web address]. By joining [employer]’s FollowMyHealth portal, you will also be able to view your health information using other applications (apps) compatible with our system.

## 2024-03-13 NOTE — OCCUPATIONAL THERAPY INITIAL EVALUATION ADULT - LEVEL OF INDEPENDENCE: SCOOT/BRIDGE, REHAB EVAL
Heat rash vs viral rash vs strep rash   Throat culture     Supportive care and monitoring medication pending throat culture result, and Follow up with your PCp or  here if worse    minimum assist (75% patients effort)

## 2024-04-21 NOTE — ED ADULT NURSE NOTE - CAS EDP DISCH DISPOSITION ADMI
- Telemetry

## 2024-04-24 ENCOUNTER — EMERGENCY (EMERGENCY)
Facility: HOSPITAL | Age: 74
LOS: 1 days | Discharge: HOME | End: 2024-04-24
Attending: STUDENT IN AN ORGANIZED HEALTH CARE EDUCATION/TRAINING PROGRAM | Admitting: STUDENT IN AN ORGANIZED HEALTH CARE EDUCATION/TRAINING PROGRAM
Payer: MEDICARE

## 2024-04-24 VITALS
OXYGEN SATURATION: 99 % | HEART RATE: 93 BPM | TEMPERATURE: 98 F | SYSTOLIC BLOOD PRESSURE: 120 MMHG | DIASTOLIC BLOOD PRESSURE: 72 MMHG | RESPIRATION RATE: 17 BRPM | HEIGHT: 62 IN | WEIGHT: 134.92 LBS

## 2024-04-24 VITALS
DIASTOLIC BLOOD PRESSURE: 68 MMHG | HEART RATE: 85 BPM | RESPIRATION RATE: 18 BRPM | TEMPERATURE: 98 F | OXYGEN SATURATION: 99 % | SYSTOLIC BLOOD PRESSURE: 102 MMHG

## 2024-04-24 DIAGNOSIS — Z90.710 ACQUIRED ABSENCE OF BOTH CERVIX AND UTERUS: Chronic | ICD-10-CM

## 2024-04-24 LAB
ANION GAP SERPL CALC-SCNC: 13 MMOL/L — SIGNIFICANT CHANGE UP (ref 5–17)
ANISOCYTOSIS BLD QL: SLIGHT — SIGNIFICANT CHANGE UP
BASOPHILS # BLD AUTO: 0.13 K/UL — SIGNIFICANT CHANGE UP (ref 0–0.2)
BASOPHILS NFR BLD AUTO: 1.7 % — SIGNIFICANT CHANGE UP (ref 0–2)
BUN SERPL-MCNC: 21 MG/DL — SIGNIFICANT CHANGE UP (ref 7–23)
CALCIUM SERPL-MCNC: 10.4 MG/DL — SIGNIFICANT CHANGE UP (ref 8.4–10.5)
CHLORIDE SERPL-SCNC: 105 MMOL/L — SIGNIFICANT CHANGE UP (ref 96–108)
CO2 SERPL-SCNC: 20 MMOL/L — LOW (ref 22–31)
CREAT SERPL-MCNC: 0.72 MG/DL — SIGNIFICANT CHANGE UP (ref 0.5–1.3)
DACRYOCYTES BLD QL SMEAR: SLIGHT — SIGNIFICANT CHANGE UP
EGFR: 88 ML/MIN/1.73M2 — SIGNIFICANT CHANGE UP
EOSINOPHIL # BLD AUTO: 0.39 K/UL — SIGNIFICANT CHANGE UP (ref 0–0.5)
EOSINOPHIL NFR BLD AUTO: 5.2 % — SIGNIFICANT CHANGE UP (ref 0–6)
GLUCOSE SERPL-MCNC: 106 MG/DL — HIGH (ref 70–99)
HCT VFR BLD CALC: 32.2 % — LOW (ref 34.5–45)
HGB BLD-MCNC: 10.5 G/DL — LOW (ref 11.5–15.5)
LYMPHOCYTES # BLD AUTO: 1.85 K/UL — SIGNIFICANT CHANGE UP (ref 1–3.3)
LYMPHOCYTES # BLD AUTO: 24.4 % — SIGNIFICANT CHANGE UP (ref 13–44)
MACROCYTES BLD QL: SIGNIFICANT CHANGE UP
MANUAL SMEAR VERIFICATION: SIGNIFICANT CHANGE UP
MCHC RBC-ENTMCNC: 32.6 GM/DL — SIGNIFICANT CHANGE UP (ref 32–36)
MCHC RBC-ENTMCNC: 36.3 PG — HIGH (ref 27–34)
MCV RBC AUTO: 111.4 FL — HIGH (ref 80–100)
MICROCYTES BLD QL: SLIGHT — SIGNIFICANT CHANGE UP
MONOCYTES # BLD AUTO: 0.26 K/UL — SIGNIFICANT CHANGE UP (ref 0–0.9)
MONOCYTES NFR BLD AUTO: 3.5 % — SIGNIFICANT CHANGE UP (ref 2–14)
NEUTROPHILS # BLD AUTO: 4.94 K/UL — SIGNIFICANT CHANGE UP (ref 1.8–7.4)
NEUTROPHILS NFR BLD AUTO: 63.5 % — SIGNIFICANT CHANGE UP (ref 43–77)
NEUTS BAND # BLD: 1.7 % — SIGNIFICANT CHANGE UP (ref 0–8)
PLAT MORPH BLD: NORMAL — SIGNIFICANT CHANGE UP
PLATELET # BLD AUTO: 214 K/UL — SIGNIFICANT CHANGE UP (ref 150–400)
POLYCHROMASIA BLD QL SMEAR: SLIGHT — SIGNIFICANT CHANGE UP
POTASSIUM SERPL-MCNC: 4.2 MMOL/L — SIGNIFICANT CHANGE UP (ref 3.5–5.3)
POTASSIUM SERPL-SCNC: 4.2 MMOL/L — SIGNIFICANT CHANGE UP (ref 3.5–5.3)
RBC # BLD: 2.89 M/UL — LOW (ref 3.8–5.2)
RBC # FLD: 14.5 % — SIGNIFICANT CHANGE UP (ref 10.3–14.5)
RBC BLD AUTO: ABNORMAL
SODIUM SERPL-SCNC: 138 MMOL/L — SIGNIFICANT CHANGE UP (ref 135–145)
STOMATOCYTES BLD QL SMEAR: SLIGHT — SIGNIFICANT CHANGE UP
WBC # BLD: 7.57 K/UL — SIGNIFICANT CHANGE UP (ref 3.8–10.5)
WBC # FLD AUTO: 7.57 K/UL — SIGNIFICANT CHANGE UP (ref 3.8–10.5)

## 2024-04-24 PROCEDURE — 72131 CT LUMBAR SPINE W/O DYE: CPT | Mod: 26,MC

## 2024-04-24 PROCEDURE — 72128 CT CHEST SPINE W/O DYE: CPT | Mod: 26,MC

## 2024-04-24 PROCEDURE — 72128 CT CHEST SPINE W/O DYE: CPT | Mod: MC

## 2024-04-24 PROCEDURE — 99284 EMERGENCY DEPT VISIT MOD MDM: CPT | Mod: 25

## 2024-04-24 PROCEDURE — 36415 COLL VENOUS BLD VENIPUNCTURE: CPT

## 2024-04-24 PROCEDURE — 85025 COMPLETE CBC W/AUTO DIFF WBC: CPT

## 2024-04-24 PROCEDURE — 71045 X-RAY EXAM CHEST 1 VIEW: CPT

## 2024-04-24 PROCEDURE — 71045 X-RAY EXAM CHEST 1 VIEW: CPT | Mod: 26

## 2024-04-24 PROCEDURE — 70450 CT HEAD/BRAIN W/O DYE: CPT | Mod: 26,MC

## 2024-04-24 PROCEDURE — 80048 BASIC METABOLIC PNL TOTAL CA: CPT

## 2024-04-24 PROCEDURE — 72131 CT LUMBAR SPINE W/O DYE: CPT | Mod: MC

## 2024-04-24 PROCEDURE — 72125 CT NECK SPINE W/O DYE: CPT | Mod: MC

## 2024-04-24 PROCEDURE — 99285 EMERGENCY DEPT VISIT HI MDM: CPT

## 2024-04-24 PROCEDURE — 70450 CT HEAD/BRAIN W/O DYE: CPT | Mod: MC

## 2024-04-24 PROCEDURE — 72125 CT NECK SPINE W/O DYE: CPT | Mod: 26,MC

## 2024-04-24 RX ORDER — ACETAMINOPHEN 500 MG
650 TABLET ORAL ONCE
Refills: 0 | Status: COMPLETED | OUTPATIENT
Start: 2024-04-24 | End: 2024-04-24

## 2024-04-24 RX ORDER — IBUPROFEN 200 MG
1 TABLET ORAL
Qty: 84 | Refills: 0
Start: 2024-04-24 | End: 2024-05-14

## 2024-04-24 RX ORDER — ACETAMINOPHEN 500 MG
2 TABLET ORAL
Qty: 56 | Refills: 0
Start: 2024-04-24 | End: 2024-04-30

## 2024-04-24 RX ADMIN — Medication 650 MILLIGRAM(S): at 14:06

## 2024-04-24 NOTE — ED ADULT NURSE NOTE - NSFALLRISKINTERV_ED_ALL_ED
Assistance OOB with selected safe patient handling equipment if applicable/Assistance with ambulation/Communicate fall risk and risk factors to all staff, patient, and family/Monitor gait and stability/Provide patient with walking aids/Provide visual cue: yellow wristband, yellow gown, etc/Reinforce activity limits and safety measures with patient and family/Use of alarms - bed, stretcher, chair and/or video monitoring/Call bell, personal items and telephone in reach/Instruct patient to call for assistance before getting out of bed/chair/stretcher/Non-slip footwear applied when patient is off stretcher/[place] to call system/Physically safe environment - no spills, clutter or unnecessary equipment/Purposeful Proactive Rounding/Room/bathroom lighting operational, light cord in reach

## 2024-04-24 NOTE — ED PROVIDER NOTE - NSFOLLOWUPINSTRUCTIONS_ED_ALL_ED_FT
Please reach out to Angy Johnson ([facility] ED clinical referral coordinator) to assist you with your follow-up appointment with a neurologist and a primary care doctor.     Monday - Friday 11am-7pm  [phone]  [email or web address]     1. You were seen for back pain. A copy of any of your resulted labs, imaging, and findings have been provided to you. Make sure to view any test results that may not have yet resulted at the time of your discharge by creating a FollowMyHealth account at: [email or web address] to sign up for FollowMyHealth. Please review all of your lab, imaging, and all other results in their entirety with your primary care doctor.   2. Continue to take your home medications as prescribed. Take over the counter motrin 600 mg with food every six hours (do not exceed 3,200 mg in a 24 hour period) and supplement with tylenol 650 mg every six hours (do not exceed 4000 mg of tylenol in a 24 hour period and do not drink alcohol while taking tylenol) between the motrin dosages to have a layered effect. Consult a pharmacist or your primary care doctor with any questions.   3. Follow up with Dr. D'amico, a neurologist, and your primary care doctor within 48 hours to assess the symptoms you were seen for in the emergency department and to review all results from your visit. If you don't have a doctor, call [phone] to make an appointment.  4. Return immediately to the emergency department for new, persistent, or worsening symptoms or signs. Return immediately to the emergency department if you have chest pain, shortness of breath, loss of consciousness, fever, worsening numbness or weakness, or difficulty controlling your bowel or bladder.   5. For your for health, you should make healthy food choices and be physically active. Also, you should not smoke or use drugs, and you should not drink alcohol in excess. Please visit [email or web address] for resources and more information.

## 2024-04-24 NOTE — ED PROVIDER NOTE - CARE PLAN
1 Principal Discharge DX:	Neck pain   Principal Discharge DX:	Wedge compression fracture of thoracic vertebra  Secondary Diagnosis:	Spinal stenosis  Secondary Diagnosis:	Lumbar nerve root impingement

## 2024-04-24 NOTE — ED PROVIDER NOTE - PROGRESS NOTE DETAILS
MD Paula: cbc shows mild anemia, bmp wnl, CTH/ ct spine:  IMPRESSIONS:    Head CT: No CT evidence of acute intracranial hemorrhage.    C-spine CT:  No acute fracture.    T-spine CT: No acute fracture.  Multiple old appearing anterior wedge compression fracture defects.    L-spine CT: Subacute healing mildly displaced fracture at the   sacrococcygeal junction.  No acute fracture in the lumbar spine.  L2-3 right-sided facet hypertrophy may but the exiting right L2 nerve   root.  L4-5 suspected moderate to severe canal stenosis and possible impingement   of both exiting L4 nerve roots.  L5-S1 suspected moderate bilateral neural foraminal stenosis.  MRI may provide helpful additional evaluation, if clinically indicated.    cxr: IMPRESSION:    Lungs clear. No infiltrate pleural effusion or pneumothorax. Unremarkable   cardiac silhouette. No acute bone abnormality.    nsg states pt's c collar can be removed, pt wants it to stay on for now.    neurosurgery consulted and evaluated pt at bedside. reviewed above findings with attending Dr. D'Amico, they recommend that pt can be discharged with outpatient neurosurgery f/u with Dr. D'Amico whose office can help arrange MRI outpatient of pt's spine, also recommends outpatient neurology appointment. pt updated and is aware and okay with plan. Patient denies focal numbness or weakness or bowel or bladder retention or incontinence.  No concern for, or signs or symptoms of cord compression at this time.  Explained to patient that  they do not exhibit signs of cord compression at this time, however if they start to experience worsening back pain,  numbness or tingling or weakness in the arms or legs, or inability to control urinating or defecating, that these are signs of emergent cord compression and that they should return to the emergency department immediately.  Patient states understanding. will dc with cane and neursurgery, neurology, and pmd f/u, referral coordinator info provided on dc papers

## 2024-04-24 NOTE — ED PROVIDER NOTE - NS ED ROS FT
positive: Neck pain, feet numbness and weakness,  fall   negative: Fever, chills, congestion, cough, chest pain, shortness of breath, nausea, vomiting, diarrhea, abdominal pain, dysuria, hematuria, leg edema, rash,  bowel or bladder retention or incontinence

## 2024-04-24 NOTE — ED ADULT NURSE NOTE - OBJECTIVE STATEMENT
72 yo female c/o neck pain. Pt reports last Thanksgiving she fell and hit her nose and has since had neck pain. Pt reports last Tuesday she fell twice in one day, once onto her hands and knees and her breast area has been hurting since, the other time she reports she was attempting to sit in a chair and missed the chair and fell onto her bottom. Denies hitting her head when she fell last week. Endorses neck pain, denies pain anywhere else. Pt reports it is getting more difficult for her to walk and lift her feet up. Pt reports she cannot keep her head up when sitting upright. Speech is flighty and tangential, difficult for pt to stay on subject and answer questions directly. Reports she lives in an elevatory building apt "sometimes alone". Denies CP, SOB, dizziness.

## 2024-04-24 NOTE — ED ADULT NURSE REASSESSMENT NOTE - NS ED NURSE REASSESS COMMENT FT1
Charge notified pt is a fall risk and should be moved to [facility] and placed in a stretcher.

## 2024-04-24 NOTE — ED ADULT NURSE NOTE - CAS ELECT INFOMATION PROVIDED
F/U w/ neurosurg, PCP; cane/C-collar use (neurosurg cleared w/o collar, but pt wants to continue to wear)/DC instructions

## 2024-04-24 NOTE — ED PROVIDER NOTE - PATIENT PORTAL LINK FT
You can access the FollowMyHealth Patient Portal offered by [facility] by registering at the following website: [email or web address]. By joining [facility]’s FollowMyHealth portal, you will also be able to view your health information using other applications (apps) compatible with our system.

## 2024-04-24 NOTE — ED ADULT TRIAGE NOTE - CHIEF COMPLAINT QUOTE
"I fell at Thanksgiving and my neck has hurt ever since when I look down so I was told to come here for a neck brace. Also, the skin on my feet are dry." Denies having a PCP and is asking for a referral.

## 2024-04-24 NOTE — ED PROVIDER NOTE - CLINICAL SUMMARY MEDICAL DECISION MAKING FREE TEXT BOX
73-year-old female with no past medical history, past surgical history of hysterectomy  presents with several month history of acute on chronic neck pain that started in November 2023 after she sustained a mechanical trip and  ground-level fall over uneven floor. On exam, VS are wnl, +midline c/t ttp, +numbness bilat feet and ankles, +gait instability.    ddx: spinal fx considered, cord compression considered given feet numbness and gait instability, however no bowel/bladder retention/incontinence, motor strength intact on exam bue and ble.    Plan:  - basic labs  - CT c/t/l spine  - tylenol  - neurosurgery consult

## 2024-04-24 NOTE — ED PROVIDER NOTE - PHYSICAL EXAMINATION
GENERAL:  Awake, alert and in NAD, non-toxic appearing  ENMT: Airway patent  EYES: conjunctiva clear  CARDIAC: Regular rate, regular rhythm.  Heart sounds S1, S2, no S3, S4. No murmurs, rubs or gallops.  RESPIRATORY: Breath sounds clear and equal in bilateral anterior lung fields, no wheezes/ronchi/crackles/stridor; pt breathing and speaking comfortably with no increased WOB, no accessory mm. use, no intercostal retractions, no nasal flaring  GI: abdomen soft, non-distended, non-tender, no rebound or guarding.  SKIN: warm and dry, no rashes  PSYCH: awake, alert, calm and cooperative  EXTREMITIES: trace edema of bilat ankles  NEURO: gcs 15  spine: pt in c-collar, +midline c/t spine ttp, no l spine ttp, no c/t/l stepoffs  NEURO: pupils 3 mm, PERRL, EOMI (CN III, IV, VI), facial sensation intact to light touch in all 3 divisions bilat (CN V), face is symmetric with normal eye closure, eye opening, and smile (CN VII), hearing is normal to rubbing fingers (CN VII), palate elevates symmetrically, phonation is normal (CN IX, X),  shoulder shrug intact bilat (CN XI), tongue is midline with nl movements and no atrophy (CN XII), finger to nose test nl bilat, negative pronator drift bilat,, speech is clear; 5/5 motor strength BUE and BLE: deltoids, biceps, triceps, wrist flexors/extensors, hand , hip flexors, knee flexors/extensors, plantar/dorsiflexors, hallux flexors/extensors; sensation intact to light touch BUE and BLE: C5-T1 and L3-L4; gait limited due to instability  DIMINISHED SENSATION TO LIGHT TOUCH BILAT FEET AND ANKLES UP TO 3 INCHES ABOVE BILAT ANKLES circumferentially

## 2024-04-24 NOTE — CONSULT NOTE ADULT - SUBJECTIVE AND OBJECTIVE BOX
HISTORY OF PRESENT ILLNESS:   73 year old female, no pertinent PMH, presenting to ED with neck pain and difficulty ambulating x 5 months. Pt states on thanksgiving, she tripped over clothing on the floor and fell, hitting her head and neck. Since, she has been having neck and back pain, worse in the morning and after sitting still for long periods of time. She is also complaining of leg weakness and difficulty walking. She has been using a cane to ambulate. She denies bladder or bowel dysfunction.     PAST MEDICAL & SURGICAL HISTORY:  No pertinent past medical history  S/P hysterectomy    FAMILY HISTORY: n/a      SOCIAL HISTORY:  Tobacco Use: unk  EtOH use: unk  Substance: unk    Allergies    No Known Allergies    Intolerances    REVIEW OF SYSTEMS  General: no recent illnesses, no recent wt gain/loss, no chills  Skin/Breast: no rash, lumps, new moles, erythema, tenderness  Ophthalmologic: no change in vision, diplopia, pain, redness, tearing, dry eyes	  ENMT: no hearing loss, tinnitus, ear pain, vertigo, nasal congestion, epistaxis, sore throat  Respiratory and Thorax: no coughing, wheezing, recent URI, shortness of breath	  Cardiovascular: no chest pain, CONTRERAS, leg swelling, irregular rhythm   Gastrointestinal: no nausea, vomiting, diarrhea, abd pain, bloody stool, heartburn  Genitourinary: no frequency, dysuria, hematuria  Musculoskeletal: no joint pain, no joint swelling, no tenderness  Neurological: see HPI  Psychiatric: no confusion, no anxiousness, no depression   Hematology/Lymphatics: no bruising, easy bleeding, LAD  Endocrine: no excess urination/thirst, heat/cold intolerance  Allergic/Immunologic: no urticaria, sneezing, recurrent infections      MEDICATIONS:  Antibiotics:    Neuro:    Anticoagulation:    OTHER:    IVF:      Vital Signs Last 24 Hrs  T(C): 36.6 (24 Apr 2024 16:58), Max: 37.1 (24 Apr 2024 14:50)  T(F): 97.8 (24 Apr 2024 16:58), Max: 98.8 (24 Apr 2024 14:50)  HR: 85 (24 Apr 2024 16:58) (85 - 93)  BP: 102/68 (24 Apr 2024 16:58) (102/68 - 127/86)  BP(mean): --  RR: 18 (24 Apr 2024 16:58) (17 - 18)  SpO2: 99% (24 Apr 2024 16:58) (99% - 100%)    Parameters below as of 24 Apr 2024 16:58  Patient On (Oxygen Delivery Method): room air        PHYSICAL EXAM:  Constitutional: NAD, resting comfortably in bed.   HEENT: Pupils equal, round, reactive to light. EOMI. Face symmetric, tongue midline. +cervical collar in place  Respiratory: No respiratory distress, lungs clear to auscultation bilaterally.   Cardiovascular: RRR, S1, S2.   Gastrointestinal: Abdomen soft, non tender, nondistended.  Neurological:  AAOX3. Opens eyes. Follows commands. Speech clear.  Cranial Nerves: II-XII intact  Motor: 5/5 power in b/l UE and LE  Sensation: intact to light touch in all extremities  Pronator Drift: none  Dysmetria: not tested  Extremities: Warm, well perfused.  Wounds: none      LABS:                        10.5   7.57  )-----------( 214      ( 24 Apr 2024 15:09 )             32.2     04-24    138  |  105  |  21  ----------------------------<  106<H>  4.2   |  20<L>  |  0.72    Ca    10.4      24 Apr 2024 15:09        Urinalysis Basic - ( 24 Apr 2024 15:09 )    Color: x / Appearance: x / SG: x / pH: x  Gluc: 106 mg/dL / Ketone: x  / Bili: x / Urobili: x   Blood: x / Protein: x / Nitrite: x   Leuk Esterase: x / RBC: x / WBC x   Sq Epi: x / Non Sq Epi: x / Bacteria: x      CULTURES:      RADIOLOGY & ADDITIONAL STUDIES:  < from: CT Head No Cont (04.24.24 @ 15:08) >  IMPRESSIONS:    Head CT: No CT evidence of acute intracranial hemorrhage.    C-spine CT:  No acute fracture.    T-spine CT: No acute fracture.  Multiple old appearing anterior wedge compression fracture defects.    L-spine CT: Subacute healing mildly displaced fracture at the   sacrococcygeal junction.  No acute fracture in the lumbar spine.  L2-3 right-sided facet hypertrophy may but the exiting right L2 nerve   root.  L4-5 suspected moderate to severe canal stenosis and possible impingement   of both exiting L4 nerve roots.  L5-S1 suspected moderate bilateral neural foraminal stenosis.  MRI may provide helpful additional evaluation, if clinically indicated.    Discussed with Dr. Mitchell in the ED at 3:45 PM.    --- End of Report ---    ASTON BRADY MD; Attending Radiologist  This document has been electronically signed. Apr 24 2024  3:46PM    < end of copied text >      Assessment:  73 year old female, no PMH, presenting for neck pain and leg weakness x 5 months. Exam negative for acute findings. Neurosurgery consulted to comment on imaging and further course planning.     Plan:  - No acute neurosurgical intervention at this time.  - Recommend outpatient neurology follow up due to weakness, falls, and gait instability.  - Recommend outpatient c-spine and t-spine MRI.  - Please follow up with Dr. D'Amico outpatient.  - Please call neurosurgery with any further questions.   - Discussed with Dr. D'Amico.

## 2024-04-24 NOTE — ED PROVIDER NOTE - OBJECTIVE STATEMENT
73-year-old female with no past medical history, past surgical history of hysterectomy  presents with several month history of acute on chronic neck pain that started in November 2023 after she sustained a mechanical trip and  ground-level fall over uneven floor.  Patient states she walked normally  before then, but ever since the fall she has had neck pain as well as difficulty ambulating and has been using a cane and has sustained several falls since then due to gait and balance and numbness in her bilateral feet and ankles that started after the fall as well, also reports her legs are "weak." Patient takes aspirin is only blood thinner.  Patient denies bowel or bladder retention or incontinence.

## 2024-04-26 DIAGNOSIS — S22.000A WEDGE COMPRESSION FRACTURE OF UNSPECIFIED THORACIC VERTEBRA, INITIAL ENCOUNTER FOR CLOSED FRACTURE: ICD-10-CM

## 2024-04-26 DIAGNOSIS — M48.00 SPINAL STENOSIS, SITE UNSPECIFIED: ICD-10-CM
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
## 2024-04-26 DIAGNOSIS — M54.16 RADICULOPATHY, LUMBAR REGION: ICD-10-CM

## 2024-04-26 DIAGNOSIS — M54.2 CERVICALGIA: ICD-10-CM

## 2024-04-26 DIAGNOSIS — Z90.710 ACQUIRED ABSENCE OF BOTH CERVIX AND UTERUS: ICD-10-CM

## 2024-06-27 NOTE — DISCHARGE NOTE PROVIDER - REASON FOR ADMISSION
Syncope and Aflutter RVR syncope with prodrome of "lightheadedness"  Pt. noted to be in new Atrial Flutter 2:1  No

## 2025-01-13 NOTE — ED ADULT TRIAGE NOTE - BMI (KG/M2)
23.3
Diet, Clear Liquid:   Consistent Carbohydrate Gestational Diabetic {3 Snacks}  DASH/TLC {Sodium & Cholesterol Restricted}  Supplement Feeding Modality:  Oral  Ensure Clear Cans or Servings Per Day:  1       Frequency:  Three Times a day (01-13-25 @ 08:09)

## 2025-03-05 NOTE — ED ADULT NURSE NOTE - CHPI ED NUR SYMPTOMS POS
"Referral for procedure from [facility]      Spoke to pt to reschedule procedure(s) Colonoscopy due to MD bookout       Physician to perform procedure(s) Dr. MINH Bhat  Date of Procedure (s) 25  Arrival Time 1:00 PM  Time of Procedure(s) 2:10 PM   Location of Procedure(s) [facility] [address] [facility]  Type of Rx Prep sent to patient: Miralax  Instructions provided to patient via [facility]    Patient was informed on the following information and verbalized understanding. Screening questionnaire reviewed with patient and complete. If procedure requires anesthesia, a responsible adult needs to be present to accompany the patient home, patient cannot drive after receiving anesthesia. Appointment details are tentative, especially check-in time. Patient will receive a prep-op call 7 days prior to confirm check-in time for procedure. If applicable the patient should contact their pharmacy to verify Rx for procedure prep is ready for pick-up. Patient was advised to call the scheduling department at [phone] if pharmacy states no Rx is available. Patient was advised to call the endoscopy scheduling department if any questions or concerns arise.       Endoscopy Scheduling Department    From: Jolynn Chicas RN   Sent: 2024  12:00 AM CST   To: Melissa Terrazas MA; *     Procedure: Colonoscopy     Diagnosis: Crohn's disease     Procedure Timin-12 weeks; 2025     *If within 4 weeks selected, please estuardo as high priority*     *If greater than 12 weeks, please select "5-12 weeks" and delay sending until 2 months prior to requested date*     Provider: MATIAS Bhat     Location: Any Site     Additional Scheduling Information: No scheduling concerns     Prep Specifications:Standard prep     Is the patient taking a GLP-1 Agonist:no     Have you attached a patient to this message: yes   "
HEADACHE